# Patient Record
Sex: FEMALE | Race: WHITE | NOT HISPANIC OR LATINO | Employment: OTHER | URBAN - METROPOLITAN AREA
[De-identification: names, ages, dates, MRNs, and addresses within clinical notes are randomized per-mention and may not be internally consistent; named-entity substitution may affect disease eponyms.]

---

## 2017-01-09 ENCOUNTER — TELEPHONE (OUTPATIENT)
Dept: NEUROLOGY | Facility: MEDICAL CENTER | Age: 67
End: 2017-01-09

## 2017-01-24 ENCOUNTER — OFFICE VISIT (OUTPATIENT)
Dept: NEUROLOGY | Facility: MEDICAL CENTER | Age: 67
End: 2017-01-24
Payer: MEDICARE

## 2017-01-24 VITALS
SYSTOLIC BLOOD PRESSURE: 118 MMHG | HEART RATE: 80 BPM | OXYGEN SATURATION: 97 % | HEIGHT: 64 IN | BODY MASS INDEX: 25.27 KG/M2 | DIASTOLIC BLOOD PRESSURE: 70 MMHG | TEMPERATURE: 97.9 F | WEIGHT: 148 LBS

## 2017-01-24 DIAGNOSIS — G40.909 SEIZURE DISORDER (HCC): Chronic | ICD-10-CM

## 2017-01-24 PROCEDURE — 1101F PT FALLS ASSESS-DOCD LE1/YR: CPT | Mod: 8P | Performed by: PSYCHIATRY & NEUROLOGY

## 2017-01-24 PROCEDURE — G8420 CALC BMI NORM PARAMETERS: HCPCS | Performed by: PSYCHIATRY & NEUROLOGY

## 2017-01-24 PROCEDURE — 3014F SCREEN MAMMO DOC REV: CPT | Performed by: PSYCHIATRY & NEUROLOGY

## 2017-01-24 PROCEDURE — 4040F PNEUMOC VAC/ADMIN/RCVD: CPT | Performed by: PSYCHIATRY & NEUROLOGY

## 2017-01-24 PROCEDURE — 3017F COLORECTAL CA SCREEN DOC REV: CPT | Performed by: PSYCHIATRY & NEUROLOGY

## 2017-01-24 PROCEDURE — 1036F TOBACCO NON-USER: CPT | Performed by: PSYCHIATRY & NEUROLOGY

## 2017-01-24 PROCEDURE — 99214 OFFICE O/P EST MOD 30 MIN: CPT | Performed by: PSYCHIATRY & NEUROLOGY

## 2017-01-24 PROCEDURE — G8482 FLU IMMUNIZE ORDER/ADMIN: HCPCS | Performed by: PSYCHIATRY & NEUROLOGY

## 2017-01-24 PROCEDURE — G8432 DEP SCR NOT DOC, RNG: HCPCS | Performed by: PSYCHIATRY & NEUROLOGY

## 2017-01-24 RX ORDER — LAMOTRIGINE 25 MG/1
25 TABLET ORAL DAILY
Qty: 30 TAB | Refills: 11 | Status: SHIPPED | OUTPATIENT
Start: 2017-01-24 | End: 2018-02-13 | Stop reason: SDUPTHER

## 2017-01-24 NOTE — MR AVS SNAPSHOT
"Saida Roblero   2017 10:40 AM   Office Visit   MRN: [id number]    Department:  [facility]   Dept Phone:  [phone]    Description:  Female : 1950   Provider:  Melissa P Bloch, M.D.           Reason for Visit     Seizure follow up      Allergies as of 2017     Allergen Noted Reactions    Peanut-Derived 2014       Shellfish Allergy 2014         You were diagnosed with     Seizure disorder (CMS-HCC)   [465948]         Vital Signs     Blood Pressure Pulse Temperature Height Weight Body Mass Index    118/70 mmHg 80 36.6 °C (97.9 °F) 1.626 m (5' 4\") 67.132 kg (148 lb) 25.39 kg/m2    Oxygen Saturation Smoking Status                97% Never Smoker           Basic Information     Date Of Birth Sex Race Ethnicity Preferred Language    1950 Female White Non- English      Your appointments     May 01, 2017 10:00 AM   Established Patient with Anjelica Vidal D.O.   [facility]    [address] [address] [phone]           You will be receiving a confirmation call a few days before your appointment from our automated call confirmation system.              Problem List              ICD-10-CM Priority Class Noted - Resolved    Seizure disorder (CMS-HCC) (Chronic) G40.909 High  2013 - Present    Hypothyroid (Chronic) E03.9   2013 - Present    Peanut allergy Z91.010   1/3/2014 - Present    Shellfish allergy Z91.013   1/3/2014 - Present    Palpitations R00.2   3/24/2014 - Present    Symptomatic PVCs I49.3   3/24/2014 - Present    HLD (hyperlipidemia) E78.5   2015 - Present      Health Maintenance        Date Due Completion Dates    COLONOSCOPY 2017    MAMMOGRAM 10/26/2017 10/26/2016, 2015, 2014, 2013, 2012, 2011, 2010, 2010, 3/4/2009, 3/4/2009, 2008, 2008, 2007, 2005, 2004    BONE DENSITY 2018, 2008    PAP SMEAR " 12/6/2019 12/6/2016, 12/3/2013 (Prv Comp)    Override on 12/3/2013: Previously completed (normal - Dr. Muse's APN)    IMM DTaP/Tdap/Td Vaccine (2 - Td) 1/3/2024 1/3/2014            Current Immunizations     13-VALENT PCV PREVNAR 10/31/2016    Influenza TIV (IM) 1/13/2013    Influenza Vaccine Adult HD 10/31/2016    Pneumococcal polysaccharide vaccine (PPSV-23) 1/3/2014    SHINGLES VACCINE 1/3/2014    Tdap Vaccine 1/3/2014    Tuberculin Skin Test 9/25/2013      Below and/or attached are the medications your provider expects you to take. Review all of your home medications and newly ordered medications with your provider and/or pharmacist. Follow medication instructions as directed by your provider and/or pharmacist. Please keep your medication list with you and share with your provider. Update the information when medications are discontinued, doses are changed, or new medications (including over-the-counter products) are added; and carry medication information at all times in the event of emergency situations     Allergies:  PEANUT-DERIVED - (reactions not documented)     SHELLFISH ALLERGY - (reactions not documented)               Medications  Valid as of: January 24, 2017 - 11:24 AM    Generic Name Brand Name Tablet Size Instructions for use    Aspirin (Chew Tab) ASA 81 MG Take 81 mg by mouth every day.        Cholecalciferol (Tab) cholecalciferol 1000 UNIT Take 1,000 Units by mouth every day.        EPINEPHrine   by Injection route.        LamoTRIgine (Tab) LAMICTAL 25 MG Take 1 Tab by mouth every day.        Nutritional Supplements   Take  by mouth.        Pregnenolone (Powder) Pregnenolone  by Does not apply route.        Thyroid (Tab) ARMOUR THYROID 90 MG Take 1 Tab by mouth every day.        .                 Medicines prescribed today were sent to:     [id number] [place] - [address]    [address] [place] [address]    Phone: [phone] Fax: [phone]    Open 24 Hours?: No      Medication  refill instructions:       If your prescription bottle indicates you have medication refills left, it is not necessary to call your provider’s office. Please contact your pharmacy and they will refill your medication.    If your prescription bottle indicates you do not have any refills left, you may request refills at any time through one of the following ways: The online [employer] system (except Urgent Care), by calling your provider’s office, or by asking your pharmacy to contact your provider’s office with a refill request. Medication refills are processed only during regular business hours and may not be available until the next business day. Your provider may request additional information or to have a follow-up visit with you prior to refilling your medication.   *Please Note: Medication refills are assigned a new Rx number when refilled electronically. Your pharmacy may indicate that no refills were authorized even though a new prescription for the same medication is available at the pharmacy. Please request the medicine by name with the pharmacy before contacting your provider for a refill.           [employer] Access Code: Activation code not generated  Current [employer] Status: Active

## 2017-01-26 NOTE — PROGRESS NOTES
CHIEF COMPLAINT  Chief Complaint   Patient presents with   • Seizure     follow up       HPI  Saida Roblero is a 64 y.o. female who presents for follow up of seizure.  Seizure disorder (CMS-HCC)  PT HAS BEEN STABLE ON 25 MG Of LAMICTAL.      REVIEW OF SYSTEMS  Pertinent Positives:irregular HR, healthy diet, hypothyroid symptoms during drug adjustment   All other systems are negative.     PAST MEDICAL HISTORY  Past Medical History   Diagnosis Date   • Seizure (CMS-HCC)    • Thyroid disease    • Allergy      peanut/shellfish       SOCIAL HISTORY  Social History     Social History   • Marital Status:      Spouse Name: N/A   • Number of Children: N/A   • Years of Education: N/A     Occupational History   • Not on file.     Social History Main Topics   • Smoking status: Never Smoker    • Smokeless tobacco: Never Used   • Alcohol Use: No   • Drug Use: No   • Sexual Activity:     Partners: Male     Other Topics Concern   • Not on file     Social History Narrative       SURGICAL HISTORY  Past Surgical History   Procedure Laterality Date   • Arthroscopy, knee  1998     right   • Carpal tunnel release  1998     right   • Myomectomy         CURRENT MEDICATIONS  Current Outpatient Prescriptions   Medication Sig Dispense Refill   • lamotrigine (LAMICTAL) 25 MG Tab Take 1 Tab by mouth every day. 30 Tab 11   • aspirin (ASA) 81 MG Chew Tab chewable tablet Take 81 mg by mouth every day.     • vitamin D (CHOLECALCIFEROL) 1000 UNIT Tab Take 1,000 Units by mouth every day.     • Nutritional Supplements (DHEA PO) Take  by mouth.     • Pregnenolone Powder by Does not apply route.     • thyroid (ARMOUR THYROID) 90 MG Tab Take 1 Tab by mouth every day. (Patient taking differently: Take 120 mg by mouth every day.) 30 Tab 1   • EPINEPHrine (EPIPEN INJ) by Injection route.       No current facility-administered medications for this visit.       ALLERGIES  Allergies   Allergen Reactions   • Peanut-Derived    • Shellfish  "Allergy        PHYSICAL EXAM  VITAL SIGNS: /70 mmHg  Pulse 80  Temp(Src) 36.6 °C (97.9 °F)  Ht 1.626 m (5' 4\")  Wt 67.132 kg (148 lb)  BMI 25.39 kg/m2  SpO2 97%  Constitutional: Well developed, Well nourished, No acute distress, Non-toxic appearance.   HENT: normocephalic; atraumatic  Eyes: PERRL  Neck: Normal range of motion, No tenderness, Supple, No stridor.   Cardiovascular: Normal heart rate, Normal rhythm, No murmurs, No rubs, No gallops.   Thorax & Lungs: Normal breath sounds, No respiratory distress, No wheezing, No chest tenderness.   Abdomen: Bowel sounds normal, Soft, No tenderness, No masses, No pulsatile masses.   Skin: Warm, Dry, No erythema, No rash.   Back: No tenderness, No CVA tenderness.   Extremities: Intact distal pulses, No edema, No tenderness, No cyanosis, No clubbing.   Neurologic: a&Ox3, CN:2-12, motor : intact, sensory: Intact, CB and gait:intact  Psychiatric: Affect normal, Judgment normal, Mood normal.   Lab: Reviewed with patient in detail and as noted in results.    EEG  Abnormal   IMPRESSION: Possibly abnormal electroencephalogram with rare left   temporal sharp wave activity with phase reversals. This could   represent an underlying paroxysmal focus or could represent a normal   variant. Clinical correlation is suggested.            ASSESSMENT AND PLAN  1. Seizure disorder   Continue lamictal at low dose as seizures well contolled     I spent 25 minutes with this patient, over fifty percent was spent counseling patient on their condition, best management practices, reviewing test results and risks and benefits of treatment.        "

## 2017-02-07 ENCOUNTER — OFFICE VISIT (OUTPATIENT)
Dept: MEDICAL GROUP | Facility: PHYSICIAN GROUP | Age: 67
End: 2017-02-07
Payer: MEDICARE

## 2017-02-07 VITALS
DIASTOLIC BLOOD PRESSURE: 76 MMHG | TEMPERATURE: 98.6 F | SYSTOLIC BLOOD PRESSURE: 158 MMHG | RESPIRATION RATE: 16 BRPM | HEIGHT: 64 IN | HEART RATE: 106 BPM | WEIGHT: 146 LBS | BODY MASS INDEX: 24.92 KG/M2 | OXYGEN SATURATION: 98 %

## 2017-02-07 DIAGNOSIS — N61.1 LEFT BREAST ABSCESS: ICD-10-CM

## 2017-02-07 PROBLEM — N63.23 BREAST LUMP ON LEFT SIDE AT 5 O'CLOCK POSITION: Status: ACTIVE | Noted: 2017-02-07

## 2017-02-07 PROCEDURE — G8420 CALC BMI NORM PARAMETERS: HCPCS | Performed by: NURSE PRACTITIONER

## 2017-02-07 PROCEDURE — 1101F PT FALLS ASSESS-DOCD LE1/YR: CPT | Performed by: NURSE PRACTITIONER

## 2017-02-07 PROCEDURE — 99214 OFFICE O/P EST MOD 30 MIN: CPT | Performed by: NURSE PRACTITIONER

## 2017-02-07 PROCEDURE — G8432 DEP SCR NOT DOC, RNG: HCPCS | Performed by: NURSE PRACTITIONER

## 2017-02-07 PROCEDURE — 3014F SCREEN MAMMO DOC REV: CPT | Performed by: NURSE PRACTITIONER

## 2017-02-07 PROCEDURE — 4040F PNEUMOC VAC/ADMIN/RCVD: CPT | Performed by: NURSE PRACTITIONER

## 2017-02-07 PROCEDURE — 3017F COLORECTAL CA SCREEN DOC REV: CPT | Performed by: NURSE PRACTITIONER

## 2017-02-07 PROCEDURE — G8482 FLU IMMUNIZE ORDER/ADMIN: HCPCS | Performed by: NURSE PRACTITIONER

## 2017-02-07 PROCEDURE — 1036F TOBACCO NON-USER: CPT | Performed by: NURSE PRACTITIONER

## 2017-02-07 RX ORDER — THYROID, PORCINE 120 MG/1
120 TABLET ORAL DAILY
COMMUNITY
Start: 2017-02-02

## 2017-02-07 RX ORDER — THYROID 30 MG/1
30 TABLET ORAL
COMMUNITY
Start: 2017-02-02 | End: 2017-12-01

## 2017-02-07 RX ORDER — DOXYCYCLINE HYCLATE 100 MG
100 TABLET ORAL 2 TIMES DAILY
Qty: 20 TAB | Refills: 0 | Status: SHIPPED | OUTPATIENT
Start: 2017-02-07 | End: 2017-05-01

## 2017-02-07 ASSESSMENT — PAIN SCALES - GENERAL: PAINLEVEL: NO PAIN

## 2017-02-07 NOTE — MR AVS SNAPSHOT
"Saida Roblero   2017 10:40 AM   Office Visit   MRN: [id number]    Department:  [facility]   Dept Phone:  [phone]    Description:  Female : 1950   Provider:  UNRULY Kothari           Reason for Visit     Nodule L breast x 1 year       Allergies as of 2017     Allergen Noted Reactions    Peanut-Derived 2014       Shellfish Allergy 2014         You were diagnosed with     Left breast abscess   [[id number]]         Vital Signs     Blood Pressure Pulse Temperature Respirations Height Weight    158/76 mmHg 106 37 °C (98.6 °F) 16 1.626 m (5' 4\") 66.225 kg (146 lb)    Body Mass Index Oxygen Saturation Breastfeeding? Smoking Status          25.05 kg/m2 98% No Never Smoker         Basic Information     Date Of Birth Sex Race Ethnicity Preferred Language    1950 Female White Non- English      Your appointments     May 01, 2017 10:00 AM   [facility]    [address] [address]   [phone]           You will be receiving a confirmation call a few days before your appointment from our automated call confirmation system.              Problem List              ICD-10-CM Priority Class Noted - Resolved    Seizure disorder (CMS-HCC) (Chronic) G40.909 High  2013 - Present    Hypothyroid (Chronic) E03.9   2013 - Present    Peanut allergy Z91.010   1/3/2014 - Present    Shellfish allergy Z91.013   1/3/2014 - Present    Palpitations R00.2   3/24/2014 - Present    Symptomatic PVCs I49.3   3/24/2014 - Present    HLD (hyperlipidemia) E78.5   2015 - Present    Left breast abscess N61.1   2017 - Present      Health Maintenance        Date Due Completion Dates    COLONOSCOPY 2017    MAMMOGRAM 10/26/2017 10/26/2016, 2015, 2014, 2013, 2012, 2011, 2010, 2010, 3/4/2009, 3/4/2009, 2008, 2008, 2007, " 12/16/2005, 11/12/2004    BONE DENSITY 7/25/2018 7/25/2013, 8/1/2008    PAP SMEAR 12/6/2019 12/6/2016, 12/3/2013 (Prv Comp)    Override on 12/3/2013: Previously completed (normal - Dr. Muse's APN)    IMM DTaP/Tdap/Td Vaccine (2 - Td) 1/3/2024 1/3/2014            Current Immunizations     13-VALENT PCV PREVNAR 10/31/2016    Influenza TIV (IM) 1/13/2013    Influenza Vaccine Adult HD 10/31/2016    Pneumococcal polysaccharide vaccine (PPSV-23) 1/3/2014    SHINGLES VACCINE 1/3/2014    Tdap Vaccine 1/3/2014    Tuberculin Skin Test 9/25/2013      Below and/or attached are the medications your provider expects you to take. Review all of your home medications and newly ordered medications with your provider and/or pharmacist. Follow medication instructions as directed by your provider and/or pharmacist. Please keep your medication list with you and share with your provider. Update the information when medications are discontinued, doses are changed, or new medications (including over-the-counter products) are added; and carry medication information at all times in the event of emergency situations     Allergies:  PEANUT-DERIVED - (reactions not documented)     SHELLFISH ALLERGY - (reactions not documented)               Medications  Valid as of: February 07, 2017 - 11:43 AM    Generic Name Brand Name Tablet Size Instructions for use    Aspirin (Chew Tab) ASA 81 MG Take 81 mg by mouth every day.        Cholecalciferol (Tab) cholecalciferol 1000 UNIT Take 1,000 Units by mouth every day.        Doxycycline Hyclate (Tab) VIBRAMYCIN 100 MG Take 1 Tab by mouth 2 times a day.        EPINEPHrine   by Injection route.        LamoTRIgine (Tab) LAMICTAL 25 MG Take 1 Tab by mouth every day.        Nutritional Supplements   Take  by mouth.        Pregnenolone (Powder) Pregnenolone  by Does not apply route.        Thyroid (Tab) ARMOUR THYROID 120 MG Take 120 mg by mouth every day.        Thyroid (Tab) ARMOUR THYROID 30 MG Take 30 mg  by mouth every 3 days. 2x weekly        .                 Medicines prescribed today were sent to:     [id number] - [place] [place] - [address]    [address] [place] [address]    Phone: [phone] Fax: [phone]    Open 24 Hours?: No      Medication refill instructions:       If your prescription bottle indicates you have medication refills left, it is not necessary to call your provider’s office. Please contact your pharmacy and they will refill your medication.    If your prescription bottle indicates you do not have any refills left, you may request refills at any time through one of the following ways: The online [employer] system (except Urgent Care), by calling your provider’s office, or by asking your pharmacy to contact your provider’s office with a refill request. Medication refills are processed only during regular business hours and may not be available until the next business day. Your provider may request additional information or to have a follow-up visit with you prior to refilling your medication.   *Please Note: Medication refills are assigned a new Rx number when refilled electronically. Your pharmacy may indicate that no refills were authorized even though a new prescription for the same medication is available at the pharmacy. Please request the medicine by name with the pharmacy before contacting your provider for a refill.        Your To Do List     Future Labs/Procedures Complete By Expires    US-BREAST COMPLETE-LEFT  As directed 2/7/2018      Referral     A referral request has been sent to our patient care coordination department. Please allow 3-5 business days for us to process this request and contact you either by phone or mail. If you do not hear from us by the 5th business day, please call us at [phone].           [employer] Access Code: Activation code not generated  Current [employer] Status: Active

## 2017-02-07 NOTE — PROGRESS NOTES
"Chief Complaint   Patient presents with   • Nodule     L breast x 1 year        HISTORY OF PRESENT ILLNESS: Patient is a 66 y.o. female established patient who presents today to discuss left breast lump.    Left breast abscess  Patient had lump for approximately 2 years, was the size of a nickel, mobile, painless, told it was a cyst or possible fatty deposit. States that yesterday she woke up and noticed it was tender. States that it was larger and has continued to increase in size over the last 24 hours. Patient states that it looks like a \"boil\", with a white head and is tender to the touch. Patient states that she has been using warm compresses, which improves pain. Patient has not tried any over-the-counter pain medications. Patient states nothing makes it better but that it is getting worse over time.      Patient Active Problem List    Diagnosis Date Noted   • Seizure disorder (CMS-[facility]) 05/13/2013     Priority: High   • Left breast abscess 02/07/2017   • HLD (hyperlipidemia) 06/24/2015   • Palpitations 03/24/2014   • Symptomatic PVCs 03/24/2014   • Peanut allergy 01/03/2014   • Shellfish allergy 01/03/2014   • Hypothyroid 05/13/2013       Allergies:Peanut-derived and Shellfish allergy    Current Outpatient Prescriptions   Medication Sig Dispense Refill   • doxycycline (VIBRAMYCIN) 100 MG Tab Take 1 Tab by mouth 2 times a day. 20 Tab 0   • lamotrigine (LAMICTAL) 25 MG Tab Take 1 Tab by mouth every day. 30 Tab 11   • aspirin (ASA) 81 MG Chew Tab chewable tablet Take 81 mg by mouth every day.     • vitamin D (CHOLECALCIFEROL) 1000 UNIT Tab Take 1,000 Units by mouth every day.     • Nutritional Supplements (DHEA PO) Take  by mouth.     • Pregnenolone Powder by Does not apply route.     • ARMOUR THYROID 120 MG Tab Take 120 mg by mouth every day.     • ARMOUR THYROID 30 MG Tab Take 30 mg by mouth every 3 days. 2x weekly     • EPINEPHrine (EPIPEN INJ) by Injection route.       No current facility-administered " "medications for this visit.       Social History   Substance Use Topics   • Smoking status: Never Smoker    • Smokeless tobacco: Never Used   • Alcohol Use: No       Family Status   Relation Status Death Age   • Mother     • Father       Family History   Problem Relation Age of Onset   • Heart Disease Maternal Grandmother    • Thyroid Mother    • Stroke Mother    • Heart Disease Mother      a fib   • Other Mother      temp arth   • Other Father      waldenstroms lymphoma       Review of Systems:   Constitutional:  Negative for fever, chills, weight loss and malaise/fatigue.   HEENT:  Negative for ear pain, nosebleeds, congestion, sore throat and neck pain.    Eyes:  Negative for blurred vision.   Respiratory:  Negative for cough, sputum production, shortness of breath and wheezing.    Cardiovascular:  Negative for chest pain, palpitations, orthopnea and leg swelling.   Gastrointestinal:  Negative for heartburn, nausea, vomiting and abdominal pain.   Genitourinary:  Negative for dysuria, urgency and frequency.   Musculoskeletal:  Negative for myalgias, back pain and joint pain.   Skin:  Negative for rash and itching. Positive lesion, left breast.  Neurological:  Negative for dizziness, tingling, tremors, sensory change, focal weakness and headaches.   Endo/Heme/Allergies:  Does not bruise/bleed easily.   Psychiatric/Behavioral:  Negative for depression, suicidal ideas and memory loss.  The patient is not nervous/anxious and does not have insomnia.    All other systems reviewed and are negative except as in HPI.    Exam:  Blood pressure 158/76, pulse 106, temperature 37 °C (98.6 °F), resp. rate 16, height 1.626 m (5' 4\"), weight 66.225 kg (146 lb), SpO2 98 %, not currently breastfeeding.  General:  Normal appearing. No distress.  HEENT:  Normocephalic. Eyes conjunctiva clear lids without ptosis, pupils equal and reactive to light accommodation, ears normal shape and contour, canals are clear " bilaterally, tympanic membranes are benign, nasal mucosa benign, oropharynx is without erythema, edema or exudates.   Neck:  Supple without JVD or bruit. Thyroid is not enlarged.  Pulmonary:  Clear to ausculation.  Normal effort. No rales, ronchi, or wheezing.  Cardiovascular:  Regular rate and rhythm without murmur. Carotid and radial pulses are intact and equal bilaterally.  Abdomen:  Soft, nontender, nondistended. Normal bowel sounds. Liver and spleen are not palpable  Neurologic:  Grossly nonfocal  Lymph:  No cervical, supraclavicular or axillary lymph nodes are palpable  Skin:  Warm and dry.  4 in oval erythematous, swollen area, warm to touch on left inferior lateral breast (5 'o clock) with 1 in white center. Tender to palpation.   Musculoskeletal:  Normal gait. No extremity cyanosis, clubbing, or edema.  Psych:  Normal mood and affect. Alert and oriented x3. Judgment and insight is normal.      Medical decision-making and discussion: Saida is generally well-appearing 66 -year-old female patient here today to discuss left breast abscess. Ultrasound of abscess is ordered to determine need for surgical intervention. Doxycycline 100 mg twice a day ×10 days ordered at this time for MRSA coverage. Patient is encouraged to use warm compresses 15 min 4 times per day. Counseled patient that abscess may open and drain on its own and patient should encourage drainage, keep the area clean and dry. If wound opened patient is encouraged to follow up for wound care. Referral to Dr. Mcneil provided for possible I&D. Patient is encouraged to follow up in the emergency room if she develops fever, chills or systemic symptoms of infection. Patient is also encouragedin the emergency room for dizziness, low blood pressure, chest pain, palpitations, shortness of breath. Patient will follow-up in 2 weeks as needed with PCP.      Please note that this dictation was created using voice recognition software. I have made every  reasonable attempt to correct obvious errors, but I expect that there are errors of grammar and possibly content that I did not discover before finalizing the note.    Assessment/Plan:  1. Left breast abscess  doxycycline (VIBRAMYCIN) 100 MG Tab    US-BREAST COMPLETE-LEFT    REFERRAL TO GENERAL SURGERY          I have placed the below orders and discussed them with an approved delegating provider. The MA is performing the below orders under the direction of Dr. Montague.

## 2017-02-07 NOTE — ASSESSMENT & PLAN NOTE
"Patient had lump for approximately 2 years, was the size of a nickel, mobile, painless, told it was a cyst or possible fatty deposit. States that yesterday she woke up and noticed it was tender. States that it was larger and has continued to increase in size over the last 24 hours. Patient states that it looks like a \"boil\", with a white head and is tender to the touch. Patient states that she has been using warm compresses, which improves pain. Patient has not tried any over-the-counter pain medications. Patient states nothing makes it better but that it is getting worse over time.  "

## 2017-02-09 ENCOUNTER — HOSPITAL ENCOUNTER (OUTPATIENT)
Dept: RADIOLOGY | Facility: MEDICAL CENTER | Age: 67
End: 2017-02-09
Attending: NURSE PRACTITIONER
Payer: MEDICARE

## 2017-02-09 ENCOUNTER — HOSPITAL ENCOUNTER (OUTPATIENT)
Dept: RADIOLOGY | Facility: MEDICAL CENTER | Age: 67
End: 2017-02-09
Attending: FAMILY MEDICINE
Payer: MEDICARE

## 2017-02-09 DIAGNOSIS — N61.1 ABSCESS OF LEFT BREAST: ICD-10-CM

## 2017-02-09 PROCEDURE — 76642 ULTRASOUND BREAST LIMITED: CPT | Mod: LT

## 2017-02-10 ENCOUNTER — TELEPHONE (OUTPATIENT)
Dept: MEDICAL GROUP | Facility: PHYSICIAN GROUP | Age: 67
End: 2017-02-10

## 2017-02-10 ENCOUNTER — HOSPITAL ENCOUNTER (EMERGENCY)
Facility: MEDICAL CENTER | Age: 67
End: 2017-02-10
Attending: EMERGENCY MEDICINE
Payer: MEDICARE

## 2017-02-10 VITALS
WEIGHT: 145.5 LBS | DIASTOLIC BLOOD PRESSURE: 79 MMHG | RESPIRATION RATE: 18 BRPM | OXYGEN SATURATION: 98 % | HEIGHT: 64 IN | TEMPERATURE: 98.4 F | BODY MASS INDEX: 24.84 KG/M2 | SYSTOLIC BLOOD PRESSURE: 149 MMHG | HEART RATE: 100 BPM

## 2017-02-10 DIAGNOSIS — N61.1 BREAST ABSCESS OF FEMALE: ICD-10-CM

## 2017-02-10 DIAGNOSIS — L02.818 CUTANEOUS ABSCESS OF OTHER SITE: ICD-10-CM

## 2017-02-10 PROCEDURE — 99283 EMERGENCY DEPT VISIT LOW MDM: CPT

## 2017-02-10 PROCEDURE — 303977 HCHG I & D

## 2017-02-10 RX ORDER — HYDROCODONE BITARTRATE AND ACETAMINOPHEN 5; 325 MG/1; MG/1
1-2 TABLET ORAL EVERY 4 HOURS PRN
Qty: 20 TAB | Refills: 0 | Status: SHIPPED | OUTPATIENT
Start: 2017-02-10 | End: 2017-12-01

## 2017-02-10 RX ORDER — CLINDAMYCIN HYDROCHLORIDE 300 MG/1
300 CAPSULE ORAL 4 TIMES DAILY
Qty: 40 CAP | Refills: 0 | Status: SHIPPED | OUTPATIENT
Start: 2017-02-10 | End: 2017-05-01

## 2017-02-10 ASSESSMENT — PAIN SCALES - GENERAL: PAINLEVEL_OUTOF10: 4

## 2017-02-10 NOTE — ED AVS SNAPSHOT
Home Care Instructions                                                                                                                Saida Roblero   MRN: [id number]    Department:  [facility], Emergency Dept   Date of Visit:  2/10/2017            [facility], Emergency Dept    [address]    [place] [address]    Phone:  [phone]      You were seen by     Bill Jacobo M.D.      Your Diagnosis Was     Breast abscess of female     N61.1       Medication Information     Review all of your home medications and newly ordered medications with your primary doctor and/or pharmacist as soon as possible. Follow medication instructions as directed by your doctor and/or pharmacist.     Please keep your complete medication list with you and share with your physician. Update the information when medications are discontinued, doses are changed, or new medications (including over-the-counter products) are added; and carry medication information at all times in the event of emergency situations.               Medication List      START taking these medications        Instructions    clindamycin 300 MG Caps   Commonly known as:  CLEOCIN    Take 1 Cap by mouth 4 times a day.   Dose:  300 mg       hydrocodone-acetaminophen 5-325 MG Tabs per tablet   Commonly known as:  NORCO    Take 1-2 Tabs by mouth every four hours as needed.   Dose:  1-2 Tab         ASK your doctor about these medications        Instructions    * ARMOUR THYROID 120 MG Tabs   Generic drug:  thyroid    Take 120 mg by mouth every day.   Dose:  120 mg       * ARMOUR THYROID 30 MG Tabs   Generic drug:  thyroid    Take 30 mg by mouth every 3 days. 2x weekly   Dose:  30 mg       aspirin 81 MG Chew chewable tablet   Commonly known as:  ASA    Take 81 mg by mouth every day.   Dose:  81 mg       DHEA PO    Take  by mouth.       doxycycline 100 MG Tabs   Commonly known as:  VIBRAMYCIN    Take 1 Tab by  mouth 2 times a day.   Dose:  100 mg       EPIPEN INJ    by Injection route.       lamotrigine 25 MG Tabs   Commonly known as:  LAMICTAL    Take 1 Tab by mouth every day.   Dose:  25 mg       Pregnenolone Powd    by Does not apply route.       vitamin D 1000 UNIT Tabs   Commonly known as:  cholecalciferol    Take 1,000 Units by mouth every day.   Dose:  1000 Units       * Notice:  This list has 2 medication(s) that are the same as other medications prescribed for you. Read the directions carefully, and ask your doctor or other care provider to review them with you.              Discharge Instructions       Percutaneous Abscess Drain, Care After  Refer to this sheet in the next few weeks. These instructions provide you with information on caring for yourself after your procedure. Your health care provider may also give you more specific instructions. Your treatment has been planned according to current medical practices, but problems sometimes occur. Call your health care provider if you have any problems or questions after your procedure.  WHAT TO EXPECT AFTER THE PROCEDURE  After your procedure, it is typical to have the following:   · A small amount of discomfort in the area where the drainage tube was placed.  · A small amount of bruising around the area where the drainage tube was placed.  · Sleepiness and fatigue for the rest of the day from the medicines used.  HOME CARE INSTRUCTIONS  · Rest at home for 1-2 days following your procedure or as directed by your health care provider.  · If you go home right after the procedure, plan to have someone with you for 24 hours.  · Do not take a bath or shower for 24 hours after your procedure.  · Take medicines only as directed by your health care provider. Ask your health care provider when you can resume taking any normal medicines.  · Change bandages (dressings) as directed.    · You may be told to record the amount of drainage from the bag every time you empty it.  Follow your health care provider's directions for emptying the bag. Write down the amount of drainage, the date, and the time you emptied it.  · Call your health care provider when the drain is putting out less than 10 mL of drainage per day for 2-3 days in a row or as directed by your health care provider.  · Follow your health care provider's instructions for cleaning the drainage tube. You may need to clean the tube every day so that it does not clog.  SEEK MEDICAL CARE IF:  · You have increased bleeding (more than a small spot) from the site where the drainage tube was placed.  · You have redness, swelling, or increasing pain around the site where the drainage tube was placed.  · You notice a discharge or bad smell coming from the site where the drainage tube was placed.  · You have a fever or chills.   · You have pain that is not helped by medicine.    SEEK IMMEDIATE MEDICAL CARE IF:  · There is leakage around the drainage tube.  · The drainage tube pulls out.  · You suddenly stop having drainage from the tube.  · You suddenly have blood in the drainage fluid.  · You become dizzy or faint.  · You develop a rash.    · You have nausea or vomiting.  · You have difficulty breathing, feel short of breath, or feel faint.    · You develop chest pain.  · You have problems with your speech or vision.  · You have trouble balancing or moving your arms or legs.     This information is not intended to replace advice given to you by your health care provider. Make sure you discuss any questions you have with your health care provider.     Document Released: 05/04/2015 Document Revised: 10/06/2015 Document Reviewed: 05/04/2015  [employer] Interactive Patient Education ©2016 [employer]    Abscess  An abscess is an infected area that contains a collection of pus and debris. It can occur in almost any part of the body. An abscess is also known as a furuncle or boil.  CAUSES   An abscess occurs when tissue gets infected. This  can occur from blockage of oil or sweat glands, infection of hair follicles, or a minor injury to the skin. As the body tries to fight the infection, pus collects in the area and creates pressure under the skin. This pressure causes pain. People with weakened immune systems have difficulty fighting infections and get certain abscesses more often.   SYMPTOMS  Usually an abscess develops on the skin and becomes a painful mass that is red, warm, and tender. If the abscess forms under the skin, you may feel a moveable soft area under the skin. Some abscesses break open (rupture) on their own, but most will continue to get worse without care. The infection can spread deeper into the body and eventually into the bloodstream, causing you to feel ill.   DIAGNOSIS   Your caregiver will take your medical history and perform a physical exam. A sample of fluid may also be taken from the abscess to determine what is causing your infection.  TREATMENT   Your caregiver may prescribe antibiotic medicines to fight the infection. However, taking antibiotics alone usually does not cure an abscess. Your caregiver may need to make a small cut (incision) in the abscess to drain the pus. In some cases, gauze is packed into the abscess to reduce pain and to continue draining the area.  HOME CARE INSTRUCTIONS   · Only take over-the-counter or prescription medicines for pain, discomfort, or fever as directed by your caregiver.  · If you were prescribed antibiotics, take them as directed. Finish them even if you start to feel better.  · If gauze is used, follow your caregiver's directions for changing the gauze.  · To avoid spreading the infection:  · Keep your draining abscess covered with a bandage.  · Wash your hands well.  · Do not share personal care items, towels, or whirlpools with others.  · Avoid skin contact with others.  · Keep your skin and clothes clean around the abscess.  · Keep all follow-up appointments as directed by your  caregiver.  SEEK MEDICAL CARE IF:   · You have increased pain, swelling, redness, fluid drainage, or bleeding.  · You have muscle aches, chills, or a general ill feeling.  · You have a fever.  MAKE SURE YOU:   · Understand these instructions.  · Will watch your condition.  · Will get help right away if you are not doing well or get worse.     This information is not intended to replace advice given to you by your health care provider. Make sure you discuss any questions you have with your health care provider.     Document Released: 09/27/2006 Document Revised: 06/18/2013 Document Reviewed: 03/01/2013  [employer] Interactive Patient Education ©2016 [employer]    Incision and Drainage  Incision and drainage is a procedure in which a sac-like structure (cystic structure) is opened and drained. The area to be drained usually contains material such as pus, fluid, or blood.   LET YOUR CAREGIVER KNOW ABOUT:   · Allergies to medicine.  · Medicines taken, including vitamins, herbs, eyedrops, over-the-counter medicines, and creams.  · Use of steroids (by mouth or creams).  · Previous problems with anesthetics or numbing medicines.  · History of bleeding problems or blood clots.  · Previous surgery.  · Other health problems, including diabetes and kidney problems.  · Possibility of pregnancy, if this applies.  RISKS AND COMPLICATIONS  · Pain.  · Bleeding.  · Scarring.  · Infection.  BEFORE THE PROCEDURE   You may need to have an ultrasound or other imaging tests to see how large or deep your cystic structure is. Blood tests may also be used to determine if you have an infection or how severe the infection is. You may need to have a tetanus shot.  PROCEDURE   The affected area is cleaned with a cleaning fluid. The cyst area will then be numbed with a medicine (local anesthetic). A small incision will be made in the cystic structure. A syringe or catheter may be used to drain the contents of the cystic structure, or the  contents may be squeezed out. The area will then be flushed with a cleansing solution. After cleansing the area, it is often gently packed with a gauze or another wound dressing. Once it is packed, it will be covered with gauze and tape or some other type of wound dressing.   AFTER THE PROCEDURE   · Often, you will be allowed to go home right after the procedure.  · You may be given antibiotic medicine to prevent or heal an infection.  · If the area was packed with gauze or some other wound dressing, you will likely need to come back in 1 to 2 days to get it removed.  · The area should heal in about 14 days.     This information is not intended to replace advice given to you by your health care provider. Make sure you discuss any questions you have with your health care provider.    Packing removal in 2-3 days. Return if increased pain or fever.     Document Released: 06/13/2002 Document Revised: 06/18/2013 Document Reviewed: 02/11/2013  [employer] Interactive Patient Education ©2016 [employer]            Patient Information     Patient Information    Following emergency treatment: all patient requiring follow-up care must return either to a private physician or a clinic if your condition worsens before you are able to obtain further medical attention, please return to the emergency room.     Billing Information    At [facility], we work to make the billing process streamlined for our patients.  Our Representatives are here to answer any questions you may have regarding your hospital bill.  If you have insurance coverage and have supplied your insurance information to us, we will submit a claim to your insurer on your behalf.  Should you have any questions regarding your bill, we can be reached online or by phone as follows:  Online: You are able pay your bills online or live chat with our representatives about any billing questions you may have. We are here to help Monday - Friday from 8:00am to 7:30pm and  9:00am - 12:00pm on Saturdays.  Please visit [email or web address]  for more information.   Phone:  [phone] or [phone]    Please note that your emergency physician, surgeon, pathologist, radiologist, anesthesiologist, and other specialists are not employed by [facility] and will therefore bill separately for their services.  Please contact them directly for any questions concerning their bills at the numbers below:     Emergency Physician Services:  [phone]  [facility]:  [phone]  [facility]:  [phone]  [facility]:  [phone]    1. Your final bill may vary from the amount quoted upon discharge if all procedures are not complete at that time, or if your doctor has additional procedures of which we are not aware. You will receive an additional bill if you return to the Emergency Department at [facility] for suture removal regardless of the facility of which the sutures were placed.     2. Please arrange for settlement of this account at the emergency registration.    3. All self-pay accounts are due in full at the time of treatment.  If you are unable to meet this obligation then payment is expected within 4-5 days.     4. If you have had radiology studies (CT, X-ray, Ultrasound, MRI), you have received a preliminary result during your emergency department visit. Please contact the radiology department [phone] to receive a copy of your final result. Please discuss the Final result with your primary physician or with the follow up physician provided.     Crisis Hotline:  [place] Crisis Hotline:  4-431-YVHEVDX or 1-802.476.2288  [place] Crisis Hotline:    1-518.877.1588 or 454-303-7419         ED Discharge Follow Up Questions    1. In order to provide you with very good care, we would like to follow up with a phone call in the next few days.  May we have your permission to contact you?     YES /   NO    2. What is the best phone number to call you? (       )_____-__________    3. What is the best time to call you?      Morning  /  Afternoon  /  Evening                   Patient Signature:  ____________________________________________________________    Date:  ____________________________________________________________      Your appointments     May 01, 2017 10:00 AM   [facility]    [address] [facility] [id number] [phone]           You will be receiving a confirmation call a few days before your appointment from our automated call confirmation system.

## 2017-02-10 NOTE — ED PROVIDER NOTES
"ED Provider Note    CHIEF COMPLAINT  Chief Complaint   Patient presents with   • Abscess       HPI  Saida Roblero is a 66 y.o. female who presents to the emergency department for evaluation of worsening pain to a left breast abscess. Patient was seen by her physician on Tuesday. Patient has increased pain and redness. Patient noted a small lump to this area 2 years ago and is suddenly increased in size. She describes no fever chills or constitutional symptoms. No aggravating or relieving maneuvers.    Review of chart shows the patient had an ultrasound of the left breast yesterday. No previous ER evaluations. No recent hospitalizations.    REVIEW OF SYSTEMS  See HPI for further details. All other systems are negative.     PAST MEDICAL HISTORY  Past Medical History   Diagnosis Date   • Seizure (CMS-HCC)    • Thyroid disease    • Allergy      peanut/shellfish       FAMILY HISTORY  No significant family history    SOCIAL HISTORY  Social History     Social History   • Marital Status:      Spouse Name: N/A   • Number of Children: N/A   • Years of Education: N/A     Social History Main Topics   • Smoking status: Never Smoker    • Smokeless tobacco: Never Used   • Alcohol Use: No   • Drug Use: No   • Sexual Activity:     Partners: Male     Other Topics Concern   • None     Social History Narrative       SURGICAL HISTORY  Past Surgical History   Procedure Laterality Date   • Arthroscopy, knee  1998     right   • Carpal tunnel release  1998     right   • Myomectomy         CURRENT MEDICATIONS  Home Medications     **Home medications have not yet been reviewed for this encounter**          ALLERGIES  Allergies   Allergen Reactions   • Peanut-Derived    • Shellfish Allergy        PHYSICAL EXAM  VITAL SIGNS: /79 mmHg  Pulse 100  Temp(Src) 36.9 °C (98.4 °F)  Resp 18  Ht 1.626 m (5' 4\")  Wt 66 kg (145 lb 8.1 oz)  BMI 24.96 kg/m2  SpO2 98%  Constitutional: Well developed, Well nourished, No acute " distress, Non-toxic appearance.   HENT: Normocephalic, Atraumatic, Bilateral external ears normal, Oropharynx moist, no evidence of dehydration, No oral exudates, Nose normal.   Eyes: PERRLA, EOMI, Conjunctiva normal, No discharge.   Neck: Normal range of motion, No tenderness, Supple, No stridor. No masses. No evidence of meningitis or meningismus.   Lymphatic: No lymphadenopathy noted.   Thorax & Lungs: Normal breath sounds, No respiratory distress, No wheezing or rhonchi, No chest tenderness.   Skin: Warm, Dry, No erythema, No rash. No exanthem. Large lesion to the lateral left breast. Moderate erythema. Fluctuant consistent with abscess.  Extremities:  No edema, No tenderness, No cyanosis, No clubbing.   Musculoskeletal: Good range of motion in all major joints. No major deformities noted.   Neurologic: Alert & oriented x 3, Normal motor function, No focal deficits noted.   Psychiatric: Affect normal, mood normal.                        RADIOLOGY/PROCEDURES  IMPRESSION:     1.  Complex compressible fluid collection is identified in the left breast at the 4:00 position 8 cm from the nipple. Differential diagnosis includes breast abscess. Hematoma or inflamed fluid collection is also a possibility. This lesion is probably    benign. Follow-up left breast ultrasound is recommended in 3 months.     I and D of breast abscess: Discussed with patient pain medication prior or even conscious sedation. Patient has a problem and that she cannot get a ride home. For this reason we have opted local anesthesia. The area was prepped with Betadine. Area over the apex of lesion injected with lidocaine with epinephrine. The area was incised with expulsion of a large amount of purulent material. Was expressed. Loculations broken up with needle . The abscess was then packed with gauze. Sterile dressing applied. Patient tolerated the procedure without difficulty.      COURSE & MEDICAL DECISION MAKING  Pertinent Labs & Imaging  studies reviewed. (See chart for details)  Patient tolerated ID without difficulty.    Patient discharged with Vicodin for pain clindamycin. She has follow up appointment on Monday with his surgeon. Patient's packing removal in 2-3 days. Return here if increased pain or swelling.    FINAL IMPRESSION  1. Breast abscess of female    2. Cutaneous abscess of other site    3. Incision and drainage of breast abscess by myself      Electronically signed by: Bill Jacobo, 2/10/2017 1:46 PM

## 2017-02-10 NOTE — TELEPHONE ENCOUNTER
----- Message from UNRULY Kothari sent at 2/10/2017 11:53 AM PST -----  Please call pt and give lab results: ultrasound is consistent with abscess. If this is not resolving rapidly with antibiotics and warm compresses I would recommend moving forward with surgical consultation. If you have systemic symptoms including fever or chills please follow-up in urgent care or emergency.

## 2017-02-10 NOTE — ED AVS SNAPSHOT
Childcare Bridge Access Code: Activation code not generated  Current Childcare Bridge Status: Active    Househappyhart  A secure, online tool to manage your health information     [employer]’s Childcare Bridge® is a secure, online tool that connects you to your personalized health information from the privacy of your home -- day or night - making it very easy for you to manage your healthcare. Once the activation process is completed, you can even access your medical information using the Childcare Bridge gray, which is available for free in the Apple Gray store or Google Play store.     Childcare Bridge provides the following levels of access (as shown below):   My Chart Features   [facility] Primary Care Doctor [facility]  Specialists [facility]  Urgent  Care Non-[facility]  Primary Care  Doctor   Email your healthcare team securely and privately 24/7 X X X X   Manage appointments: schedule your next appointment; view details of past/upcoming appointments X      Request prescription refills. X      View recent personal medical records, including lab and immunizations X X X X   View health record, including health history, allergies, medications X X X X   Read reports about your outpatient visits, procedures, consult and ER notes X X X X   See your discharge summary, which is a recap of your hospital and/or ER visit that includes your diagnosis, lab results, and care plan. X X       How to register for Childcare Bridge:  1. Go to  [email or web address].  2. Click on the Sign Up Now box, which takes you to the New Member Sign Up page. You will need to provide the following information:  a. Enter your Childcare Bridge Access Code exactly as it appears at the top of this page. (You will not need to use this code after you’ve completed the sign-up process. If you do not sign up before the expiration date, you must request a new code.)   b. Enter your date of birth.   c. Enter your home email address.   d. Click Submit, and follow the next screen’s instructions.  3. Create a Childcare Bridge ID. This will  be your ShareThe login ID and cannot be changed, so think of one that is secure and easy to remember.  4. Create a ShareThe password. You can change your password at any time.  5. Enter your Password Reset Question and Answer. This can be used at a later time if you forget your password.   6. Enter your e-mail address. This allows you to receive e-mail notifications when new information is available in ShareThe.  7. Click Sign Up. You can now view your health information.    For assistance activating your ShareThe account, call [phone]

## 2017-02-10 NOTE — ED AVS SNAPSHOT
2/10/2017          Saida Roblero  [address]  [place] [address]    Dear Saida:    [facility] wants to ensure your discharge home is safe and you or your loved ones have had all your questions answered regarding your care after you leave the hospital.    You may receive a telephone call within two days of your discharge.  This call is to make certain you understand your discharge instructions as well as ensure we provided you with the best care possible during your stay with us.     The call will only last approximately 3-5 minutes and will be done by a nurse.    Once again, we want to ensure your discharge home is safe and that you have a clear understanding of any next steps in your care.  If you have any questions or concerns, please do not hesitate to contact us, we are here for you.  Thank you for choosing [facility] for your healthcare needs.    Sincerely,    Elmer Gonzales    [facility]

## 2017-02-10 NOTE — TELEPHONE ENCOUNTER
Phone Number Called: [phone] (home)     Message: LVM to call back.    Left Message for patient to call back: yes

## 2017-02-10 NOTE — ED NOTES
Seen at her PCP's office for left breast abscess this past Tuesday, pt presents to our facility reporting worsening pain and appearance.

## 2017-02-10 NOTE — DISCHARGE INSTRUCTIONS
Percutaneous Abscess Drain, Care After  Refer to this sheet in the next few weeks. These instructions provide you with information on caring for yourself after your procedure. Your health care provider may also give you more specific instructions. Your treatment has been planned according to current medical practices, but problems sometimes occur. Call your health care provider if you have any problems or questions after your procedure.  WHAT TO EXPECT AFTER THE PROCEDURE  After your procedure, it is typical to have the following:   · A small amount of discomfort in the area where the drainage tube was placed.  · A small amount of bruising around the area where the drainage tube was placed.  · Sleepiness and fatigue for the rest of the day from the medicines used.  HOME CARE INSTRUCTIONS  · Rest at home for 1-2 days following your procedure or as directed by your health care provider.  · If you go home right after the procedure, plan to have someone with you for 24 hours.  · Do not take a bath or shower for 24 hours after your procedure.  · Take medicines only as directed by your health care provider. Ask your health care provider when you can resume taking any normal medicines.  · Change bandages (dressings) as directed.    · You may be told to record the amount of drainage from the bag every time you empty it. Follow your health care provider's directions for emptying the bag. Write down the amount of drainage, the date, and the time you emptied it.  · Call your health care provider when the drain is putting out less than 10 mL of drainage per day for 2-3 days in a row or as directed by your health care provider.  · Follow your health care provider's instructions for cleaning the drainage tube. You may need to clean the tube every day so that it does not clog.  SEEK MEDICAL CARE IF:  · You have increased bleeding (more than a small spot) from the site where the drainage tube was placed.  · You have redness,  swelling, or increasing pain around the site where the drainage tube was placed.  · You notice a discharge or bad smell coming from the site where the drainage tube was placed.  · You have a fever or chills.   · You have pain that is not helped by medicine.    SEEK IMMEDIATE MEDICAL CARE IF:  · There is leakage around the drainage tube.  · The drainage tube pulls out.  · You suddenly stop having drainage from the tube.  · You suddenly have blood in the drainage fluid.  · You become dizzy or faint.  · You develop a rash.    · You have nausea or vomiting.  · You have difficulty breathing, feel short of breath, or feel faint.    · You develop chest pain.  · You have problems with your speech or vision.  · You have trouble balancing or moving your arms or legs.     This information is not intended to replace advice given to you by your health care provider. Make sure you discuss any questions you have with your health care provider.     Document Released: 05/04/2015 Document Revised: 10/06/2015 Document Reviewed: 05/04/2015  IfOnly Interactive Patient Education ©2016 [employer]    Abscess  An abscess is an infected area that contains a collection of pus and debris. It can occur in almost any part of the body. An abscess is also known as a furuncle or boil.  CAUSES   An abscess occurs when tissue gets infected. This can occur from blockage of oil or sweat glands, infection of hair follicles, or a minor injury to the skin. As the body tries to fight the infection, pus collects in the area and creates pressure under the skin. This pressure causes pain. People with weakened immune systems have difficulty fighting infections and get certain abscesses more often.   SYMPTOMS  Usually an abscess develops on the skin and becomes a painful mass that is red, warm, and tender. If the abscess forms under the skin, you may feel a moveable soft area under the skin. Some abscesses break open (rupture) on their own, but most will  continue to get worse without care. The infection can spread deeper into the body and eventually into the bloodstream, causing you to feel ill.   DIAGNOSIS   Your caregiver will take your medical history and perform a physical exam. A sample of fluid may also be taken from the abscess to determine what is causing your infection.  TREATMENT   Your caregiver may prescribe antibiotic medicines to fight the infection. However, taking antibiotics alone usually does not cure an abscess. Your caregiver may need to make a small cut (incision) in the abscess to drain the pus. In some cases, gauze is packed into the abscess to reduce pain and to continue draining the area.  HOME CARE INSTRUCTIONS   · Only take over-the-counter or prescription medicines for pain, discomfort, or fever as directed by your caregiver.  · If you were prescribed antibiotics, take them as directed. Finish them even if you start to feel better.  · If gauze is used, follow your caregiver's directions for changing the gauze.  · To avoid spreading the infection:  · Keep your draining abscess covered with a bandage.  · Wash your hands well.  · Do not share personal care items, towels, or whirlpools with others.  · Avoid skin contact with others.  · Keep your skin and clothes clean around the abscess.  · Keep all follow-up appointments as directed by your caregiver.  SEEK MEDICAL CARE IF:   · You have increased pain, swelling, redness, fluid drainage, or bleeding.  · You have muscle aches, chills, or a general ill feeling.  · You have a fever.  MAKE SURE YOU:   · Understand these instructions.  · Will watch your condition.  · Will get help right away if you are not doing well or get worse.     This information is not intended to replace advice given to you by your health care provider. Make sure you discuss any questions you have with your health care provider.     Document Released: 09/27/2006 Document Revised: 06/18/2013 Document Reviewed:  03/01/2013  [employer] Interactive Patient Education ©2016 [employer]    Incision and Drainage  Incision and drainage is a procedure in which a sac-like structure (cystic structure) is opened and drained. The area to be drained usually contains material such as pus, fluid, or blood.   LET YOUR CAREGIVER KNOW ABOUT:   · Allergies to medicine.  · Medicines taken, including vitamins, herbs, eyedrops, over-the-counter medicines, and creams.  · Use of steroids (by mouth or creams).  · Previous problems with anesthetics or numbing medicines.  · History of bleeding problems or blood clots.  · Previous surgery.  · Other health problems, including diabetes and kidney problems.  · Possibility of pregnancy, if this applies.  RISKS AND COMPLICATIONS  · Pain.  · Bleeding.  · Scarring.  · Infection.  BEFORE THE PROCEDURE   You may need to have an ultrasound or other imaging tests to see how large or deep your cystic structure is. Blood tests may also be used to determine if you have an infection or how severe the infection is. You may need to have a tetanus shot.  PROCEDURE   The affected area is cleaned with a cleaning fluid. The cyst area will then be numbed with a medicine (local anesthetic). A small incision will be made in the cystic structure. A syringe or catheter may be used to drain the contents of the cystic structure, or the contents may be squeezed out. The area will then be flushed with a cleansing solution. After cleansing the area, it is often gently packed with a gauze or another wound dressing. Once it is packed, it will be covered with gauze and tape or some other type of wound dressing.   AFTER THE PROCEDURE   · Often, you will be allowed to go home right after the procedure.  · You may be given antibiotic medicine to prevent or heal an infection.  · If the area was packed with gauze or some other wound dressing, you will likely need to come back in 1 to 2 days to get it removed.  · The area should heal in  about 14 days.     This information is not intended to replace advice given to you by your health care provider. Make sure you discuss any questions you have with your health care provider.    Packing removal in 2-3 days. Return if increased pain or fever.     Document Released: 06/13/2002 Document Revised: 06/18/2013 Document Reviewed: 02/11/2013  [employer] Interactive Patient Education ©2016 [employer]vier [employer].

## 2017-02-10 NOTE — ED NOTES
Discharge information provided. Pt verbalized understanding of discharge instructions to follow up with PCP and to return to ER if condition worsens. Pt expressed the awareness of not driving or operating heavy machinery. Pt ambulated out of ER in a steady gait.

## 2017-02-11 NOTE — TELEPHONE ENCOUNTER
I spoke with patient and she explained she went to the ER earlier today and they took care of the abscess.  She has a follow up with Surgical on Tuesday.

## 2017-05-01 ENCOUNTER — OFFICE VISIT (OUTPATIENT)
Dept: MEDICAL GROUP | Facility: PHYSICIAN GROUP | Age: 67
End: 2017-05-01
Payer: MEDICARE

## 2017-05-01 VITALS
HEIGHT: 64 IN | WEIGHT: 147 LBS | OXYGEN SATURATION: 97 % | HEART RATE: 87 BPM | TEMPERATURE: 97 F | DIASTOLIC BLOOD PRESSURE: 70 MMHG | RESPIRATION RATE: 16 BRPM | BODY MASS INDEX: 25.1 KG/M2 | SYSTOLIC BLOOD PRESSURE: 146 MMHG

## 2017-05-01 DIAGNOSIS — G40.909 SEIZURE DISORDER (HCC): Chronic | ICD-10-CM

## 2017-05-01 DIAGNOSIS — E03.8 OTHER SPECIFIED HYPOTHYROIDISM: Chronic | ICD-10-CM

## 2017-05-01 DIAGNOSIS — E78.2 MIXED HYPERLIPIDEMIA: ICD-10-CM

## 2017-05-01 PROCEDURE — 3017F COLORECTAL CA SCREEN DOC REV: CPT | Performed by: FAMILY MEDICINE

## 2017-05-01 PROCEDURE — G8432 DEP SCR NOT DOC, RNG: HCPCS | Performed by: FAMILY MEDICINE

## 2017-05-01 PROCEDURE — 99214 OFFICE O/P EST MOD 30 MIN: CPT | Performed by: FAMILY MEDICINE

## 2017-05-01 PROCEDURE — 1101F PT FALLS ASSESS-DOCD LE1/YR: CPT | Performed by: FAMILY MEDICINE

## 2017-05-01 PROCEDURE — 4040F PNEUMOC VAC/ADMIN/RCVD: CPT | Performed by: FAMILY MEDICINE

## 2017-05-01 PROCEDURE — 1036F TOBACCO NON-USER: CPT | Performed by: FAMILY MEDICINE

## 2017-05-01 PROCEDURE — G8419 CALC BMI OUT NRM PARAM NOF/U: HCPCS | Performed by: FAMILY MEDICINE

## 2017-05-01 PROCEDURE — 3014F SCREEN MAMMO DOC REV: CPT | Performed by: FAMILY MEDICINE

## 2017-05-01 NOTE — ASSESSMENT & PLAN NOTE
Ongoing issue. Patient reports 100% compliance with 2 doses of [place] Thyroid: 120 mg daily with an additional 30 mg every 3 days. She currently is followed by an endocrine specialists. Lab review shows that her TSH continues to be suppressed with a normal T4. She reports that overall she feels well and denies any specific issues.

## 2017-05-01 NOTE — PROGRESS NOTES
Subjective:   Saida Roblero is a 66 y.o. female here today for thyroid; lipids; seizure    Seizure disorder (CMS-HCC)  Ongoing issue. Patient reports 100% compliance with Lamictal 25 mg daily. Patient denies occurrence of any seizures. She currently is being followed by neurology.    Hypothyroid  Ongoing issue. Patient reports 100% compliance with 2 doses of [place] Thyroid: 120 mg daily with an additional 30 mg every 3 days. She currently is followed by an endocrine specialists. Lab review shows that her TSH continues to be suppressed with a normal T4. She reports that overall she feels well and denies any specific issues.    HLD (hyperlipidemia)  Ongoing issue. Patient currently is on a medication but chart does show that she has an elevated total cholesterol and triglyceride level. Patient denies any significant family history at this point.         Current medicines (including changes today)  Current Outpatient Prescriptions   Medication Sig Dispense Refill   • hydrocodone-acetaminophen (NORCO) 5-325 MG Tab per tablet Take 1-2 Tabs by mouth every four hours as needed. 20 Tab 0   • ARMOUR THYROID 120 MG Tab Take 120 mg by mouth every day.     • ARMOUR THYROID 30 MG Tab Take 30 mg by mouth every 3 days. 2x weekly     • lamotrigine (LAMICTAL) 25 MG Tab Take 1 Tab by mouth every day. 30 Tab 11   • aspirin (ASA) 81 MG Chew Tab chewable tablet Take 81 mg by mouth every day.     • vitamin D (CHOLECALCIFEROL) 1000 UNIT Tab Take 1,000 Units by mouth every day.     • Nutritional Supplements (DHEA PO) Take  by mouth.     • Pregnenolone Powder by Does not apply route.     • EPINEPHrine (EPIPEN INJ) by Injection route.       No current facility-administered medications for this visit.     She  has a past medical history of Seizure (CMS-HCC); Thyroid disease; and Allergy. She also has no past medical history of Breast cancer (CMS-HCC).    ROS   No chest pain, no shortness of breath, no abdominal pain       Objective:  "    Blood pressure 146/70, pulse 87, temperature 36.1 °C (97 °F), resp. rate 16, height 1.626 m (5' 4.02\"), weight 66.679 kg (147 lb), SpO2 97 %. Body mass index is 25.22 kg/(m^2).   Physical Exam:  Alert, oriented in no acute distress.  Eye contact is good, speech goal directed, affect calm  HEENT: conjunctiva non-injected, sclera non-icteric.  Pinna normal. TM pearly gray.   Oral mucous membranes pink and moist with no lesions.  Neck No adenopathy or masses in the neck or supraclavicular regions.  Lungs: clear to auscultation bilaterally with good excursion.  CV: regular rate and rhythm.  Abdomen: soft, nontender, No CVAT  Ext: no edema, color normal, vascularity normal, temperature normal  Neuro: CN 2-12 grossly intact      Assessment and Plan:   The following treatment plan was discussed     1. Other specified hypothyroidism  TSH    FREE THYROXINE    TRIIDOTHYRONINE    THYROID PEROXIDASE  (TPO) AB    PROLACTIN    Stable. Recommend a repeat labs in 6 months; continue current medications; monitor   2. Mixed hyperlipidemia  LIPID PROFILE    Uncontrolled. Encouraged healthy eating and daily exercise; recheck labs in 6 months. Monitor   3. Seizure disorder (CMS-HCC)      Stable. Continue current medications; continue follow-up with neurology. Monitor       Followup: Return in about 4 weeks (around 5/29/2017) for thyroid/lipids/labs, Short.            "

## 2017-05-01 NOTE — MR AVS SNAPSHOT
"        Saida GUERRERO AdelitatammiFelisha   2017 10:00 AM   Office Visit   MRN: [id number]    Department:  [facility]   Dept Phone:  [phone]    Description:  Female : 1950   Provider:  Anjelica Vidal D.O.           Reason for Visit     Follow-Up check up      Allergies as of 2017     Allergen Noted Reactions    Peanut-Derived 2014       Shellfish Allergy 2014         You were diagnosed with     Other specified hypothyroidism   [3952944]       Mixed hyperlipidemia   [272.2.ICD-9-CM]       Seizure disorder (CMS-HCC)   [820644]         Vital Signs     Blood Pressure Pulse Temperature Respirations Height Weight    146/70 mmHg 87 36.1 °C (97 °F) 16 1.626 m (5' 4.02\") 66.679 kg (147 lb)    Body Mass Index Oxygen Saturation Smoking Status             25.22 kg/m2 97% Never Smoker          Basic Information     Date Of Birth Sex Race Ethnicity Preferred Language    1950 Female White Non- English      Your appointments     2017  7:40 AM   Established Patient with Anjelica Vidal D.O.   [facility] ([place])    [address] [place] [id number] [phone]           You will be receiving a confirmation call a few days before your appointment from our automated call confirmation system.              Problem List              ICD-10-CM Priority Class Noted - Resolved    Seizure disorder (CMS-HCC) (Chronic) G40.909 High  2013 - Present    Hypothyroid (Chronic) E03.9   2013 - Present    Peanut allergy Z91.010   1/3/2014 - Present    Shellfish allergy Z91.013   1/3/2014 - Present    Palpitations R00.2   3/24/2014 - Present    Symptomatic PVCs I49.3   3/24/2014 - Present    HLD (hyperlipidemia) E78.5   2015 - Present    Left breast abscess N61.1   2017 - Present      Health Maintenance        Date Due Completion Dates    COLONOSCOPY 2017    MAMMOGRAM 10/26/2017 10/26/2016, 2015, 2014, 2013, 2012, 2011, " 4/2/2010, 4/2/2010, 3/4/2009, 3/4/2009, 2/11/2008, 2/11/2008, 2/2/2007, 12/16/2005, 11/12/2004    BONE DENSITY 7/25/2018 7/25/2013, 8/1/2008    PAP SMEAR 12/6/2019 12/6/2016, 12/3/2013 (Prv Comp)    Override on 12/3/2013: Previously completed (normal - Dr. Muse's APN)    IMM DTaP/Tdap/Td Vaccine (2 - Td) 1/3/2024 1/3/2014            Current Immunizations     13-VALENT PCV PREVNAR 10/31/2016    Influenza TIV (IM) 1/13/2013    Influenza Vaccine Adult HD 10/31/2016    Pneumococcal polysaccharide vaccine (PPSV-23) 1/3/2014    SHINGLES VACCINE 1/3/2014    Tdap Vaccine 1/3/2014    Tuberculin Skin Test 9/25/2013      Below and/or attached are the medications your provider expects you to take. Review all of your home medications and newly ordered medications with your provider and/or pharmacist. Follow medication instructions as directed by your provider and/or pharmacist. Please keep your medication list with you and share with your provider. Update the information when medications are discontinued, doses are changed, or new medications (including over-the-counter products) are added; and carry medication information at all times in the event of emergency situations     Allergies:  PEANUT-DERIVED - (reactions not documented)     SHELLFISH ALLERGY - (reactions not documented)               Medications  Valid as of: May 01, 2017 - 10:43 AM    Generic Name Brand Name Tablet Size Instructions for use    Aspirin (Chew Tab) ASA 81 MG Take 81 mg by mouth every day.        Cholecalciferol (Tab) cholecalciferol 1000 UNIT Take 1,000 Units by mouth every day.        EPINEPHrine   by Injection route.        Hydrocodone-Acetaminophen (Tab) NORCO 5-325 MG Take 1-2 Tabs by mouth every four hours as needed.        LamoTRIgine (Tab) LAMICTAL 25 MG Take 1 Tab by mouth every day.        Nutritional Supplements   Take  by mouth.        Pregnenolone (Powder) Pregnenolone  by Does not apply route.        Thyroid (Tab) ARMOUR THYROID 120  MG Take 120 mg by mouth every day.        Thyroid (Tab) JC THYROID 30 MG Take 30 mg by mouth every 3 days. 2x weekly        .                 Medicines prescribed today were sent to:     [id number] [place], [place] - [address]    [address] [place] [address]    Phone: [phone] Fax: [phone]    Open 24 Hours?: No      Medication refill instructions:       If your prescription bottle indicates you have medication refills left, it is not necessary to call your provider’s office. Please contact your pharmacy and they will refill your medication.    If your prescription bottle indicates you do not have any refills left, you may request refills at any time through one of the following ways: The online Badoo system (except Urgent Care), by calling your provider’s office, or by asking your pharmacy to contact your provider’s office with a refill request. Medication refills are processed only during regular business hours and may not be available until the next business day. Your provider may request additional information or to have a follow-up visit with you prior to refilling your medication.   *Please Note: Medication refills are assigned a new Rx number when refilled electronically. Your pharmacy may indicate that no refills were authorized even though a new prescription for the same medication is available at the pharmacy. Please request the medicine by name with the pharmacy before contacting your provider for a refill.        Your To Do List     Future Labs/Procedures Complete By Expires    FREE THYROXINE  As directed 5/2/2018    LIPID PROFILE  As directed 5/2/2018    PROLACTIN  As directed 5/1/2018    THYROID PEROXIDASE  (TPO) AB  As directed 5/2/2018    TRIIDOTHYRONINE  As directed 5/1/2018    TSH  As directed 5/2/2018         Badoo Access Code: Activation code not generated  Current Badoo Status: Active

## 2017-05-01 NOTE — ASSESSMENT & PLAN NOTE
Ongoing issue. Patient currently is on a medication but chart does show that she has an elevated total cholesterol and triglyceride level. Patient denies any significant family history at this point.

## 2017-05-01 NOTE — ASSESSMENT & PLAN NOTE
Ongoing issue. Patient reports 100% compliance with Lamictal 25 mg daily. Patient denies occurrence of any seizures. She currently is being followed by neurology.

## 2017-05-03 ENCOUNTER — PATIENT OUTREACH (OUTPATIENT)
Dept: HEALTH INFORMATION MANAGEMENT | Facility: OTHER | Age: 67
End: 2017-05-03

## 2017-05-03 NOTE — PROGRESS NOTES
Attempt #:1    WebIZ Checked & Epic Updated: yes  HealthConnect Verified: yes  Verify PCP: yes    Communication Preference Obtained: yes     Review Care Team: yes    Annual Wellness Visit Scheduling  1. Scheduling Status:Scheduled     Care Gap Scheduling (Attempt to Schedule EACH Overdue Care Gap!)     Health Maintenance Due   Topic Date Due   • Annual Wellness Visit  Scheduled   • COLONOSCOPY  Will discuss with PCP         EveryScapehart Activation: already active  66. [employer] Gray: no  Virtual Visits: no  Opt In to Text Messages: no

## 2017-05-11 ENCOUNTER — HOSPITAL ENCOUNTER (OUTPATIENT)
Dept: LAB | Facility: MEDICAL CENTER | Age: 67
End: 2017-05-11
Attending: FAMILY MEDICINE
Payer: MEDICARE

## 2017-05-11 DIAGNOSIS — E78.2 MIXED HYPERLIPIDEMIA: ICD-10-CM

## 2017-05-11 DIAGNOSIS — E03.8 OTHER SPECIFIED HYPOTHYROIDISM: Chronic | ICD-10-CM

## 2017-05-11 LAB
CHOLEST SERPL-MCNC: 226 MG/DL (ref 100–199)
HDLC SERPL-MCNC: 74 MG/DL
LDLC SERPL CALC-MCNC: 128 MG/DL
PROLACTIN SERPL-MCNC: 6.65 NG/ML (ref 2.8–26)
T3 SERPL-MCNC: 146.6 NG/DL (ref 60–181)
T4 FREE SERPL-MCNC: 1.12 NG/DL (ref 0.53–1.43)
THYROPEROXIDASE AB SERPL-ACNC: 0.5 IU/ML (ref 0–9)
TRIGL SERPL-MCNC: 118 MG/DL (ref 0–149)
TSH SERPL DL<=0.005 MIU/L-ACNC: 0.03 UIU/ML (ref 0.3–3.7)

## 2017-05-11 PROCEDURE — 86376 MICROSOMAL ANTIBODY EACH: CPT

## 2017-05-11 PROCEDURE — 36415 COLL VENOUS BLD VENIPUNCTURE: CPT

## 2017-05-11 PROCEDURE — 84480 ASSAY TRIIODOTHYRONINE (T3): CPT

## 2017-05-11 PROCEDURE — 80061 LIPID PANEL: CPT

## 2017-05-11 PROCEDURE — 84439 ASSAY OF FREE THYROXINE: CPT

## 2017-05-11 PROCEDURE — 84443 ASSAY THYROID STIM HORMONE: CPT

## 2017-05-11 PROCEDURE — 84146 ASSAY OF PROLACTIN: CPT

## 2017-05-16 ENCOUNTER — TELEPHONE (OUTPATIENT)
Dept: MEDICAL GROUP | Facility: PHYSICIAN GROUP | Age: 67
End: 2017-05-16

## 2017-05-16 NOTE — TELEPHONE ENCOUNTER
.ANNUAL WELLNESS VISIT PRE-VISIT PLANNING     1.  Reviewed note from last office visit with PCP: YES    2.  If any orders were placed at last visit, do we have Results/Consult Notes?        •  Labs - Labs ordered, completed and results are in chart.       •  Imaging - Imaging was not ordered at last office visit.       •  Referrals - No referrals were ordered at last office visit.    3.  Immunizations were updated in Epic using WebIZ?: Epic matches WebIZ       •  WebIZ Recommendations: Patient is up to date on all vaccines       •  Is patient due for Tdap? NO       •  Is patient due for Shingles? NO     4.  Patient is due for the following Health Maintenance Topics:   Health Maintenance Due   Topic Date Due   • Annual Wellness Visit  06/24/2016   • COLONOSCOPY  01/17/2017       Due for Colonoscopy, attempted to call and schedule    5.  Reviewed/Updated the following with patient:       •   Preferred Pharmacy? NO       •   Preferred Lab? NO       •   Medications? NO       •   Social History? NO       •   Family History? NO    6.  Care Team Updated:       •   DME Company (gait device, O2, CPAP, etc.): N\A       •   Other Specialists (eye doctor, derm, GYN, cardiology, endo, etc): N\A    7.  Patient has the following Care Path diagnoses on Problem List:      8.  Specialty Comments was updated with diagnosis information provided by [facility]: NO    9.  Patient was advised: “This is a free wellness visit. The provider will screen for medical conditions to help you stay healthy. If you have other concerns to address you may be asked to discuss these at a separate visit or there may be an additional fee.”     10.  Patient was informed to arrive 15 min prior to their scheduled appointment and bring in their medication bottles?  YES

## 2017-06-13 ENCOUNTER — OFFICE VISIT (OUTPATIENT)
Dept: MEDICAL GROUP | Facility: PHYSICIAN GROUP | Age: 67
End: 2017-06-13
Payer: MEDICARE

## 2017-06-13 VITALS
TEMPERATURE: 99 F | BODY MASS INDEX: 24.75 KG/M2 | WEIGHT: 145 LBS | OXYGEN SATURATION: 95 % | DIASTOLIC BLOOD PRESSURE: 74 MMHG | SYSTOLIC BLOOD PRESSURE: 136 MMHG | HEIGHT: 64 IN | HEART RATE: 94 BPM

## 2017-06-13 DIAGNOSIS — E78.2 MIXED HYPERLIPIDEMIA: ICD-10-CM

## 2017-06-13 DIAGNOSIS — Z91.010 PEANUT ALLERGY: ICD-10-CM

## 2017-06-13 DIAGNOSIS — G40.909 SEIZURE DISORDER (HCC): Chronic | ICD-10-CM

## 2017-06-13 DIAGNOSIS — I49.3 PVC'S (PREMATURE VENTRICULAR CONTRACTIONS): ICD-10-CM

## 2017-06-13 DIAGNOSIS — E03.9 ACQUIRED HYPOTHYROIDISM: Chronic | ICD-10-CM

## 2017-06-13 DIAGNOSIS — Z00.00 MEDICARE ANNUAL WELLNESS VISIT, SUBSEQUENT: ICD-10-CM

## 2017-06-13 PROBLEM — N61.1 LEFT BREAST ABSCESS: Status: RESOLVED | Noted: 2017-02-07 | Resolved: 2017-06-13

## 2017-06-13 PROCEDURE — G0439 PPPS, SUBSEQ VISIT: HCPCS | Performed by: NURSE PRACTITIONER

## 2017-06-13 PROCEDURE — 99999 PR ANNUAL WELLNESS VISIT-INCLUDES PPPS SUBSEQUE*: CPT | Performed by: NURSE PRACTITIONER

## 2017-06-13 RX ORDER — EPINEPHRINE 0.3 MG/.3ML
0.3 INJECTION SUBCUTANEOUS ONCE
Qty: 0.3 ML | Refills: 0 | Status: SHIPPED | OUTPATIENT
Start: 2017-06-13 | End: 2017-06-13

## 2017-06-13 ASSESSMENT — PATIENT HEALTH QUESTIONNAIRE - PHQ9: CLINICAL INTERPRETATION OF PHQ2 SCORE: 0

## 2017-06-13 NOTE — MR AVS SNAPSHOT
"Saida Roblero   2017 8:00 AM   Office Visit   MRN: [id number]    Department:  [facility]   Dept Phone:  [phone]    Description:  Female : 1950   Provider:  UNRULY Honeycutt; [facility]            Reason for Visit     Annual Exam           Allergies as of 2017     Allergen Noted Reactions    Peanut-Derived 2014       Shellfish Allergy 2014         You were diagnosed with     Medicare annual wellness visit, subsequent   [732259]       Symptomatic PVCs   [693443]       Mixed hyperlipidemia   [272.2.ICD-9-CM]         Vital Signs     Blood Pressure Pulse Temperature Height Weight Body Mass Index    136/74 mmHg 94 37.2 °C (99 °F) 1.626 m (5' 4\") 65.772 kg (145 lb) 24.88 kg/m2    Oxygen Saturation Smoking Status                95% Never Smoker           Basic Information     Date Of Birth Sex Race Ethnicity Preferred Language    1950 Female White Non- English      Problem List              ICD-10-CM Priority Class Noted - Resolved    Seizure disorder (CMS-HCC) (Chronic) G40.909 High  2013 - Present    Hypothyroid (Chronic) E03.9   2013 - Present    Peanut allergy Z91.010   1/3/2014 - Present    Shellfish allergy Z91.013   1/3/2014 - Present    Symptomatic PVCs I49.3   3/24/2014 - Present    HLD (hyperlipidemia) E78.5   2015 - Present      Health Maintenance        Date Due Completion Dates    COLONOSCOPY 2017    MAMMOGRAM 10/26/2017 10/26/2016, 2015, 2014, 2013, 2012, 2011, 2010, 2010, 3/4/2009, 3/4/2009, 2008, 2008, 2007, 2005, 2004    BONE DENSITY 2018, 2008    PAP SMEAR 2019, 12/3/2013 (Prv Comp)    Override on 12/3/2013: Previously completed (normal - Dr. Muse's APN)    IMM DTaP/Tdap/Td Vaccine (2 - Td) 1/3/2024 1/3/2014            Current Immunizations     13-VALENT PCV PREVNAR 10/31/2016    Influenza TIV (IM) " 1/13/2013    Influenza Vaccine Adult HD 10/31/2016    Pneumococcal polysaccharide vaccine (PPSV-23) 1/3/2014    SHINGLES VACCINE 1/3/2014    Tdap Vaccine 1/3/2014    Tuberculin Skin Test 9/25/2013      Below and/or attached are the medications your provider expects you to take. Review all of your home medications and newly ordered medications with your provider and/or pharmacist. Follow medication instructions as directed by your provider and/or pharmacist. Please keep your medication list with you and share with your provider. Update the information when medications are discontinued, doses are changed, or new medications (including over-the-counter products) are added; and carry medication information at all times in the event of emergency situations     Allergies:  PEANUT-DERIVED - (reactions not documented)     SHELLFISH ALLERGY - (reactions not documented)               Medications  Valid as of: June 13, 2017 -  8:56 AM    Generic Name Brand Name Tablet Size Instructions for use    Aspirin (Chew Tab) ASA 81 MG Take 81 mg by mouth every day.        Cholecalciferol (Tab) cholecalciferol 1000 UNIT Take 1,000 Units by mouth every day.        EPINEPHrine   by Injection route.        Hydrocodone-Acetaminophen (Tab) NORCO 5-325 MG Take 1-2 Tabs by mouth every four hours as needed.        LamoTRIgine (Tab) LAMICTAL 25 MG Take 1 Tab by mouth every day.        Nutritional Supplements   Take  by mouth.        Pregnenolone (Powder) Pregnenolone  by Does not apply route.        Thyroid (Tab) ARMOUR THYROID 120 MG Take 120 mg by mouth every day.        Thyroid (Tab) ARMOUR THYROID 30 MG Take 30 mg by mouth every 3 days. 2x weekly        .                 Medicines prescribed today were sent to:     [id number] - [place] - [address]    [address] [place] [address]    Phone: [phone] Fax: [phone]    Open 24 Hours?: No      Medication refill instructions:       If your prescription bottle indicates you have  medication refills left, it is not necessary to call your provider’s office. Please contact your pharmacy and they will refill your medication.    If your prescription bottle indicates you do not have any refills left, you may request refills at any time through one of the following ways: The online Do[employer] system (except Urgent Care), by calling your provider’s office, or by asking your pharmacy to contact your provider’s office with a refill request. Medication refills are processed only during regular business hours and may not be available until the next business day. Your provider may request additional information or to have a follow-up visit with you prior to refilling your medication.   *Please Note: Medication refills are assigned a new Rx number when refilled electronically. Your pharmacy may indicate that no refills were authorized even though a new prescription for the same medication is available at the pharmacy. Please request the medicine by name with the pharmacy before contacting your provider for a refill.           Do[employer] Access Code: Activation code not generated  Current Do[employer] Status: Active

## 2017-06-13 NOTE — ASSESSMENT & PLAN NOTE
Established problem followed by PCP, currently UTD on lipids and being managed conservatively with diet/exercise

## 2017-06-13 NOTE — PROGRESS NOTES
Chief Complaint   Patient presents with   • Annual Exam         HPI:  Saida Roblero is a 67 y.o. female here for Medicare Annual Wellness Visit      Patient Active Problem List    Diagnosis Date Noted   • Seizure disorder (CMS-HCC) 05/13/2013     Priority: High   • HLD (hyperlipidemia) 06/24/2015   • PVC's (premature ventricular contractions) 03/24/2014   • Peanut allergy 01/03/2014   • Shellfish allergy 01/03/2014   • Hypothyroid 05/13/2013       Current Outpatient Prescriptions   Medication Sig Dispense Refill   • EPINEPHrine (EPIPEN) 0.3 MG/0.3ML Solution Auto-injector solution for injection 0.3 mL by Intramuscular route Once for 1 dose. 0.3 mL 0   • hydrocodone-acetaminophen (NORCO) 5-325 MG Tab per tablet Take 1-2 Tabs by mouth every four hours as needed. 20 Tab 0   • ARMOUR THYROID 120 MG Tab Take 120 mg by mouth every day.     • ARMOUR THYROID 30 MG Tab Take 30 mg by mouth every 3 days. 2x weekly     • lamotrigine (LAMICTAL) 25 MG Tab Take 1 Tab by mouth every day. 30 Tab 11   • aspirin (ASA) 81 MG Chew Tab chewable tablet Take 81 mg by mouth every day.     • vitamin D (CHOLECALCIFEROL) 1000 UNIT Tab Take 1,000 Units by mouth every day.     • Nutritional Supplements (DHEA PO) Take  by mouth.     • Pregnenolone Powder by Does not apply route.     • EPINEPHrine (EPIPEN INJ) by Injection route.       No current facility-administered medications for this visit.        Patient is taking medications as noted in medication list.  Current supplements as per medication list.   Chronic narcotic pain medicines: no    Allergies: Peanut-derived and Shellfish allergy    Current social contact/activities: Yes     Is patient current with immunizations?  Yes.     She  reports that she has never smoked. She has never used smokeless tobacco. She reports that she does not drink alcohol or use illicit drugs.  Counseling given: Not Answered        DPA/Advanced Directive:  Patient has Advanced Directive on file.     ROS:     Gait: Uses no assistive device   Ostomy: no   Other tubes: no   Amputations: no   Chronic oxygen use: no   Last eye exam: Last fall   Wears hearing aids: no   : Denies incontinence.       Screening:      Depression Screening    Little interest or pleasure in doing things?  0 - not at all  Feeling down, depressed, or hopeless?  0 - not at all  Patient Health Questionnaire Score: 0  If depressive symptoms identified deferred to follow up visit unless specifically addressed in assessment and plan.    Interpretation of PHQ-9 Total Score   Score Severity   1-4 Minimal Depression   5-9 Mild Depression   10-14 Moderate Depression   15-19 Moderately Severe Depression   20-27 Severe Depression    Screening for Cognitive Impairment    Three Minute Recall (banana, sunrise, fence)   3/3    Draw clock face with all 12 numbers set to the hand to show 10 minutes past 11 o'clock  1    If cognitive concerns identified deferred to follow up visit unless specifically addressed in assessment and plan.    Fall Risk Assessment    Has the patient had two or more falls in the last year or any fall with injury in the last year?  No  If Fall Risk identified deferred to follow up visit unless specifically addressed in assessment and plan.    Safety Assessment    Throw rugs on floor.  No  Handrails on all stairs.  Yes  Good lighting in all hallways.  Yes  Difficulty hearing.  No  Patient counseled about all safety risks that were identified.    Functional Assessment ADLs    Are there any barriers preventing you from cooking for yourself or meeting nutritional needs?  No.    Are there any barriers preventing you from driving safely or obtaining transportation?  No.    Are there any barriers preventing you from using a telephone or calling for help?  No.    Are there any barriers preventing you from shopping?  No.    Are there any barriers preventing you from taking care of your own finances?  No.    Are there any barriers preventing you  "from managing your medications?  No.    Are currently engaging any exercise or physical activity?  Yes.       Health Maintenance Summary                Annual Wellness Visit Overdue 6/24/2016      Done 6/24/2015     COLONOSCOPY Overdue 1/17/2017      Done 1/17/2014 AMB REFERRAL TO GI FOR COLONOSCOPY    MAMMOGRAM Next Due 10/26/2017      Done 10/26/2016 MA-MAMMO SCREENING BILAT W/TOMOSYNTHESIS W/CAD     Patient has more history with this topic...    BONE DENSITY Next Due 7/25/2018      Done 7/25/2013 DS-BONE DENSITY STUDY (DEXA)     Patient has more history with this topic...    PAP SMEAR Next Due 12/6/2019      Done 12/6/2016 PATHOLOGY GYN SPECIMEN     Patient has more history with this topic...    IMM DTaP/Tdap/Td Vaccine Next Due 1/3/2024      Done 1/3/2014 Imm Admin: Tdap Vaccine          Patient Care Team:  Anjelica Vidal D.O. as PCP - General (Family Medicine)  Thomas A Naegele, D.O. as Consulting Physician (Emergency Medicine)  Melissa P Bloch, M.D. as Consulting Physician (Neurology)    Social History   Substance Use Topics   • Smoking status: Never Smoker    • Smokeless tobacco: Never Used   • Alcohol Use: No     Family History   Problem Relation Age of Onset   • Heart Disease Maternal Grandmother    • Thyroid Mother    • Stroke Mother    • Heart Disease Mother      a fib   • Other Mother      temp arth   • Other Father      waldenstroms lymphoma     She  has a past medical history of Seizure (CMS-HCC); Thyroid disease; and Allergy. She also has no past medical history of Breast cancer (CMS-HCC).   Past Surgical History   Procedure Laterality Date   • Arthroscopy, knee  1998     right   • Carpal tunnel release  1998     right   • Myomectomy             Exam:     Blood pressure 136/74, pulse 94, temperature 37.2 °C (99 °F), height 1.626 m (5' 4\"), weight 65.772 kg (145 lb), SpO2 95 %. Body mass index is 24.88 kg/(m^2).    Hearing excellent  Dentition good  Alert, oriented in no acute distress.  Eye " contact is good, speech goal directed, affect calm      Assessment and Plan. The following treatment and monitoring plan is recommended:    1. Medicare annual wellness visit, subsequent  Annual Wellness Visit - Includes PPPS Subsequent ()   2. PVC's (premature ventricular contractions)  Stable with monitoring by PCP   3. Mixed hyperlipidemia  Stable followed by PCP   4. Peanut allergy  Stable, EpiPen due for renewal   EPINEPHrine (EPIPEN) 0.3 MG/0.3ML Solution Auto-injector solution for injection   5. Seizure disorder (CMS-HCC)  Stable followed by neurology    6. Acquired hypothyroidism  Stable followed by PCP         Services suggested: No services needed at this time  Health Care Screening recommendations as per orders if indicated.  Referrals offered: PT/OT/Nutrition counseling/Behavioral Health/Smoking cessation as per orders if indicated.    Discussion today about general wellness and lifestyle habits:    · Prevent falls and reduce trip hazards; Cautioned about securing or removing rugs.  · Have a working fire alarm and carbon monoxide detector;   · Engage in regular physical activity and social activities       Follow-up: Return in about 6 months (around 12/13/2017).

## 2017-06-23 ENCOUNTER — TELEPHONE (OUTPATIENT)
Dept: MEDICAL GROUP | Facility: PHYSICIAN GROUP | Age: 67
End: 2017-06-23

## 2017-06-23 DIAGNOSIS — Z91.010 PEANUT ALLERGY: ICD-10-CM

## 2017-06-23 DIAGNOSIS — Z91.013 SHELLFISH ALLERGY: ICD-10-CM

## 2017-06-23 RX ORDER — EPINEPHRINE 0.3 MG/.3ML
0.3 INJECTION SUBCUTANEOUS ONCE
Qty: 2 EACH | Refills: 3 | Status: SHIPPED | OUTPATIENT
Start: 2017-06-23 | End: 2018-07-13 | Stop reason: SDUPTHER

## 2017-06-23 NOTE — TELEPHONE ENCOUNTER
Received a refill request for and epipen 2-pack, this is not on current medication list so I am unable tore-order. Please place new order.

## 2017-07-09 ENCOUNTER — PATIENT MESSAGE (OUTPATIENT)
Dept: MEDICAL GROUP | Facility: PHYSICIAN GROUP | Age: 67
End: 2017-07-09

## 2017-07-24 ENCOUNTER — RX ONLY (OUTPATIENT)
Age: 67
Setting detail: RX ONLY
End: 2017-07-24

## 2017-11-03 ENCOUNTER — HOSPITAL ENCOUNTER (OUTPATIENT)
Dept: RADIOLOGY | Facility: MEDICAL CENTER | Age: 67
End: 2017-11-03
Attending: FAMILY MEDICINE
Payer: MEDICARE

## 2017-11-03 DIAGNOSIS — Z12.39 SCREENING BREAST EXAMINATION: ICD-10-CM

## 2017-11-03 PROCEDURE — G0202 SCR MAMMO BI INCL CAD: HCPCS

## 2017-11-08 ENCOUNTER — TELEPHONE (OUTPATIENT)
Dept: MEDICAL GROUP | Facility: PHYSICIAN GROUP | Age: 67
End: 2017-11-08

## 2017-11-08 NOTE — LETTER
[unfilled]  [unfilled] November 8, 2017     Saida Roblero  [address] [address]      Dear Saida:    Below are the results from your recent visit:    Please advise pt that the mammogram does show dense breast tissue but is otherwise normal. Based on age, recommend repeat mammogram in one year.     Anjelica Vidal D.O.     Resulted Orders   MA-MAMMO SCREENING BILAT W/FRANCIA W/CAD    Narrative    11/3/2017 5:19 PM    HISTORY/REASON FOR EXAM:  Routine Mammographic Screening.      TECHNIQUE/EXAM DESCRIPTION:  Bilateral tomosynthesis screening mammography was performed with standard mammographic images generated from the data set. Images were reviewed and interpreted with CAD.    COMPARISON:   October 26, 2016 and September 9, 2015    FINDINGS:    The breasts are heterogeneously dense, which may obscure small masses.  There is no dominant mass, suspicious calcification, or any secondary malignant sign.  Bilateral benign-appearing calcifications once again noted. A stable intramammary lymph node   identified superficially in the superior right breast.      Impression    1.  Breasts are heterogeneously dense, which may obscure small masses. No gross evidence of malignancy.    2.  Screening mammogram in one year is recommended.      R2 Category 2: Benign Finding(s)     The test results show that your current treatment is working. Please {:[id number]}. We recommend that you repeat the above test(s) in {:74805} {:11}.    If you have any questions or concerns, please don't hesitate to call.

## 2017-11-08 NOTE — TELEPHONE ENCOUNTER
----- Message from Anjelica Vidal D.O. sent at 11/7/2017  7:58 AM PST -----  Please advise pt that the mammogram does show dense breast tissue but is otherwise normal. Based on age, recommend repeat mammogram in one year.    Anjelica Vidal D.O.

## 2017-12-01 ENCOUNTER — OFFICE VISIT (OUTPATIENT)
Dept: MEDICAL GROUP | Facility: PHYSICIAN GROUP | Age: 67
End: 2017-12-01
Payer: MEDICARE

## 2017-12-01 ENCOUNTER — HOSPITAL ENCOUNTER (OUTPATIENT)
Dept: LAB | Facility: MEDICAL CENTER | Age: 67
End: 2017-12-01
Attending: FAMILY MEDICINE
Payer: MEDICARE

## 2017-12-01 VITALS
BODY MASS INDEX: 23.73 KG/M2 | HEIGHT: 64 IN | WEIGHT: 139 LBS | OXYGEN SATURATION: 96 % | TEMPERATURE: 98.3 F | HEART RATE: 71 BPM | SYSTOLIC BLOOD PRESSURE: 136 MMHG | DIASTOLIC BLOOD PRESSURE: 80 MMHG

## 2017-12-01 DIAGNOSIS — R03.0 ELEVATED BLOOD PRESSURE READING: ICD-10-CM

## 2017-12-01 DIAGNOSIS — E03.9 ACQUIRED HYPOTHYROIDISM: Chronic | ICD-10-CM

## 2017-12-01 DIAGNOSIS — E78.2 MIXED HYPERLIPIDEMIA: ICD-10-CM

## 2017-12-01 DIAGNOSIS — Z23 NEED FOR VACCINATION: ICD-10-CM

## 2017-12-01 LAB
CHOLEST SERPL-MCNC: 227 MG/DL (ref 100–199)
HDLC SERPL-MCNC: 83 MG/DL
LDLC SERPL CALC-MCNC: 112 MG/DL
T3 SERPL-MCNC: 167.8 NG/DL (ref 60–181)
T4 FREE SERPL-MCNC: 1.02 NG/DL (ref 0.53–1.43)
THYROPEROXIDASE AB SERPL-ACNC: 0.5 IU/ML (ref 0–9)
TRIGL SERPL-MCNC: 159 MG/DL (ref 0–149)
TSH SERPL DL<=0.005 MIU/L-ACNC: 0.02 UIU/ML (ref 0.3–3.7)

## 2017-12-01 PROCEDURE — 80061 LIPID PANEL: CPT

## 2017-12-01 PROCEDURE — 86376 MICROSOMAL ANTIBODY EACH: CPT

## 2017-12-01 PROCEDURE — 36415 COLL VENOUS BLD VENIPUNCTURE: CPT

## 2017-12-01 PROCEDURE — 84443 ASSAY THYROID STIM HORMONE: CPT

## 2017-12-01 PROCEDURE — 84439 ASSAY OF FREE THYROXINE: CPT

## 2017-12-01 PROCEDURE — G0008 ADMIN INFLUENZA VIRUS VAC: HCPCS | Performed by: FAMILY MEDICINE

## 2017-12-01 PROCEDURE — 84480 ASSAY TRIIODOTHYRONINE (T3): CPT

## 2017-12-01 PROCEDURE — 99214 OFFICE O/P EST MOD 30 MIN: CPT | Mod: 25 | Performed by: FAMILY MEDICINE

## 2017-12-01 PROCEDURE — 90662 IIV NO PRSV INCREASED AG IM: CPT | Performed by: FAMILY MEDICINE

## 2017-12-01 NOTE — ASSESSMENT & PLAN NOTE
Ongoing issue. Patient reports compliance with [place] Thyroid 120 mg daily. Patient did not get blood work done prior to this appointment. She reports that she is doing well since taking herself off the additional [place] Thyroid. She is having some hot flashes but this may have been secondary to supplements that she had previously been taking.

## 2017-12-01 NOTE — ASSESSMENT & PLAN NOTE
Ongoing issue. Patient has had a slightly elevated total cholesterol and LDL cholesterol. She reports that she does eat healthy but currently isn't getting any regular daily exercise.

## 2017-12-01 NOTE — PROGRESS NOTES
Subjective:   Saida Roblero is a 67 y.o. female here today for Thyroid, elevated cholesterol, elevated blood pressure    Hypothyroid  Ongoing issue. Patient reports compliance with [place] Thyroid 120 mg daily. Patient did not get blood work done prior to this appointment. She reports that she is doing well since taking herself off the additional [place] Thyroid. She is having some hot flashes but this may have been secondary to supplements that she had previously been taking.    HLD (hyperlipidemia)  Ongoing issue. Patient has had a slightly elevated total cholesterol and LDL cholesterol. She reports that she does eat healthy but currently isn't getting any regular daily exercise.    Elevated blood pressure reading  Ongoing issue. Patient's started having mildly elevated blood pressure readings on previous visits. Recheck today was within normal limits. Patient reports that she will start taking her blood pressure at home. She does not have any family history of elevated blood pressure.         Current medicines (including changes today)  Current Outpatient Prescriptions   Medication Sig Dispense Refill   • EPINEPHrine 1 MG/ML Kit 1 Kit by Injection route as needed. 2 Kit 1   • ARMOUR THYROID 120 MG Tab Take 120 mg by mouth every day.     • lamotrigine (LAMICTAL) 25 MG Tab Take 1 Tab by mouth every day. 30 Tab 11   • aspirin (ASA) 81 MG Chew Tab chewable tablet Take 81 mg by mouth every day.     • vitamin D (CHOLECALCIFEROL) 1000 UNIT Tab Take 1,000 Units by mouth every day.     • EPINEPHrine (EPIPEN INJ) by Injection route.       No current facility-administered medications for this visit.      She  has a past medical history of Allergy; Seizure (CMS-HCC); and Thyroid disease. She also has no past medical history of Breast cancer (CMS-HCC).    ROS   No chest pain, no shortness of breath, no abdominal pain       Objective:     Blood pressure 136/80, pulse 71, temperature 36.8 °C (98.3 °F), height 1.626 m  "(5' 4\"), weight 63 kg (139 lb), SpO2 96 %. Body mass index is 23.86 kg/m².   Physical Exam:  Alert, oriented in no acute distress.  Eye contact is good, speech goal directed, affect calm  HEENT: conjunctiva non-injected, sclera non-icteric.  Pinna normal. Oral mucous membranes pink and moist with no lesions.  Neck No adenopathy or masses in the neck or supraclavicular regions.  Lungs: clear to auscultation bilaterally with good excursion.  CV: regular rate and rhythm.  Abdomen: soft, nontender, No CVAT  Ext: no edema, color normal, vascularity normal, temperature normal        Assessment and Plan:   The following treatment plan was discussed     1. Acquired hypothyroidism  TSH    FREE THYROXINE    TRIIDOTHYRONINE    THYROID PEROXIDASE  (TPO) AB    Stable. Continue current medications; sent for labs and monitor for results   2. Mixed hyperlipidemia  LIPID PROFILE    Stable. Continue healthy lifestyle; monitor   3. Elevated blood pressure reading      Stable. Encouraged healthy lifestyle; monitor   4. Need for vaccination  INFLUENZA VACCINE, HIGH DOSE (65+ ONLY)    Age appropriate flu immunization provided today; patient tolerated procedure well       Followup: Return in about 6 months (around 6/1/2018) for elevated BP, Short.            "

## 2017-12-01 NOTE — ASSESSMENT & PLAN NOTE
Ongoing issue. Patient's started having mildly elevated blood pressure readings on previous visits. Recheck today was within normal limits. Patient reports that she will start taking her blood pressure at home. She does not have any family history of elevated blood pressure.

## 2018-02-03 ENCOUNTER — HOSPITAL ENCOUNTER (OUTPATIENT)
Dept: RADIOLOGY | Facility: MEDICAL CENTER | Age: 68
End: 2018-02-03
Attending: OBSTETRICS & GYNECOLOGY
Payer: MEDICARE

## 2018-02-03 DIAGNOSIS — M89.9 BONE DISEASE: ICD-10-CM

## 2018-02-03 PROCEDURE — 77080 DXA BONE DENSITY AXIAL: CPT

## 2018-02-13 ENCOUNTER — OFFICE VISIT (OUTPATIENT)
Dept: NEUROLOGY | Facility: MEDICAL CENTER | Age: 68
End: 2018-02-13
Payer: MEDICARE

## 2018-02-13 VITALS
DIASTOLIC BLOOD PRESSURE: 64 MMHG | WEIGHT: 145 LBS | HEIGHT: 64 IN | HEART RATE: 81 BPM | TEMPERATURE: 98.1 F | OXYGEN SATURATION: 95 % | BODY MASS INDEX: 24.75 KG/M2 | SYSTOLIC BLOOD PRESSURE: 136 MMHG

## 2018-02-13 DIAGNOSIS — G40.909 SEIZURE DISORDER (HCC): Chronic | ICD-10-CM

## 2018-02-13 PROCEDURE — 99214 OFFICE O/P EST MOD 30 MIN: CPT | Performed by: PSYCHIATRY & NEUROLOGY

## 2018-02-13 RX ORDER — LAMOTRIGINE 25 MG/1
25 TABLET ORAL DAILY
Qty: 90 TAB | Refills: 3 | Status: SHIPPED | OUTPATIENT
Start: 2018-02-13

## 2018-02-13 NOTE — ASSESSMENT & PLAN NOTE
No seizures in 10 years. Pt states her last one was when she had the flu and she had a temporal lobe seizure.

## 2018-02-13 NOTE — PROGRESS NOTES
CHIEF COMPLAINT  Chief Complaint   Patient presents with   • Follow-Up     seizure and meds       HPI  Saida Roblero is a 64 y.o. female who presents for follow up of seizure.  Seizure disorder (CMS-HCC)  No seizures in 10 years. Pt states her last one was when she had the flu and she had a temporal lobe seizure.    REVIEW OF SYSTEMS  Pertinent Positives:irregular HR, healthy diet, hypothyroid symptoms during drug adjustment   All other systems are negative.     PAST MEDICAL HISTORY  Past Medical History:   Diagnosis Date   • Allergy     peanut/shellfish   • Seizure (CMS-HCC)    • Thyroid disease        SOCIAL HISTORY  Social History     Social History   • Marital status:      Spouse name: N/A   • Number of children: N/A   • Years of education: N/A     Occupational History   • Not on file.     Social History Main Topics   • Smoking status: Never Smoker   • Smokeless tobacco: Never Used   • Alcohol use No   • Drug use: No   • Sexual activity: Yes     Partners: Male     Other Topics Concern   • Not on file     Social History Narrative   • No narrative on file       SURGICAL HISTORY  Past Surgical History:   Procedure Laterality Date   • ARTHROSCOPY, KNEE  1998    right   • CARPAL TUNNEL RELEASE  1998    right   • MYOMECTOMY         CURRENT MEDICATIONS  Current Outpatient Prescriptions   Medication Sig Dispense Refill   • EPINEPHrine 1 MG/ML Kit 1 Kit by Injection route as needed. 2 Kit 1   • ARMOUR THYROID 120 MG Tab Take 120 mg by mouth every day.     • lamotrigine (LAMICTAL) 25 MG Tab Take 1 Tab by mouth every day. 30 Tab 11   • aspirin (ASA) 81 MG Chew Tab chewable tablet Take 81 mg by mouth every day.     • vitamin D (CHOLECALCIFEROL) 1000 UNIT Tab Take 1,000 Units by mouth every day.     • EPINEPHrine (EPIPEN INJ) by Injection route.       No current facility-administered medications for this visit.        ALLERGIES  Allergies   Allergen Reactions   • Peanut-Derived    • Shellfish Allergy   "      PHYSICAL EXAM  VITAL SIGNS: /64   Pulse 81   Temp 36.7 °C (98.1 °F)   Ht 1.626 m (5' 4\")   Wt 65.8 kg (145 lb)   SpO2 95%   BMI 24.89 kg/m²   Constitutional: Well developed, Well nourished, No acute distress, Non-toxic appearance.   HENT: normocephalic; atraumatic  Eyes: PERRL  Neck: Normal range of motion, No tenderness, Supple, No stridor.   Cardiovascular: Normal heart rate, Normal rhythm, No murmurs, No rubs, No gallops.   Thorax & Lungs: Normal breath sounds, No respiratory distress, No wheezing, No chest tenderness.   Abdomen: Bowel sounds normal, Soft, No tenderness, No masses, No pulsatile masses.   Skin: Warm, Dry, No erythema, No rash.   Back: No tenderness, No CVA tenderness.   Extremities: Intact distal pulses, No edema, No tenderness, No cyanosis, No clubbing.   Neurologic: a&Ox3, CN:2-12, motor : intact, sensory: Intact, CB and gait:intact  Psychiatric: Affect normal, Judgment normal, Mood normal.   Lab: Reviewed with patient in detail and as noted in results.    EEG  Abnormal   IMPRESSION: Possibly abnormal electroencephalogram with rare left   temporal sharp wave activity with phase reversals. This could   represent an underlying paroxysmal focus or could represent a normal   variant. Clinical correlation is suggested.          ASSESSMENT AND PLAN  1. Seizure disorder   Continue lamictal at low dose as seizures well controlled.     I spent 25 minutes with this patient, over fifty percent was spent counseling patient on their condition, best management practices, reviewing test results and risks and benefits of treatment.        "

## 2018-06-13 ENCOUNTER — OFFICE VISIT (OUTPATIENT)
Dept: MEDICAL GROUP | Facility: PHYSICIAN GROUP | Age: 68
End: 2018-06-13
Payer: MEDICARE

## 2018-06-13 VITALS
TEMPERATURE: 97.5 F | HEIGHT: 64 IN | OXYGEN SATURATION: 99 % | BODY MASS INDEX: 24.24 KG/M2 | DIASTOLIC BLOOD PRESSURE: 62 MMHG | HEART RATE: 79 BPM | WEIGHT: 142 LBS | SYSTOLIC BLOOD PRESSURE: 132 MMHG

## 2018-06-13 DIAGNOSIS — G40.909 SEIZURE DISORDER (HCC): Chronic | ICD-10-CM

## 2018-06-13 DIAGNOSIS — E03.9 ACQUIRED HYPOTHYROIDISM: Chronic | ICD-10-CM

## 2018-06-13 DIAGNOSIS — E78.2 MIXED HYPERLIPIDEMIA: ICD-10-CM

## 2018-06-13 DIAGNOSIS — R03.0 ELEVATED BLOOD PRESSURE READING: ICD-10-CM

## 2018-06-13 DIAGNOSIS — M85.89 OSTEOPENIA OF MULTIPLE SITES: ICD-10-CM

## 2018-06-13 PROBLEM — E78.5 DYSLIPIDEMIA: Status: RESOLVED | Noted: 2018-06-13 | Resolved: 2018-06-13

## 2018-06-13 PROBLEM — E78.5 DYSLIPIDEMIA: Status: ACTIVE | Noted: 2018-06-13

## 2018-06-13 PROCEDURE — 99214 OFFICE O/P EST MOD 30 MIN: CPT | Performed by: FAMILY MEDICINE

## 2018-06-13 ASSESSMENT — PATIENT HEALTH QUESTIONNAIRE - PHQ9: CLINICAL INTERPRETATION OF PHQ2 SCORE: 0

## 2018-06-13 NOTE — ASSESSMENT & PLAN NOTE
Ongoing issue; patient currently denies any headache, dizziness, chest pain. Blood pressure in office today is mildly elevated at 132/62. Patient reports that her home blood pressure reading first thing in the morning as a systolic pressure not greater than 130 and diastolic pressure not greater than 80

## 2018-06-13 NOTE — ASSESSMENT & PLAN NOTE
Ongoing issue; patient compliant with [place] Thyroid 120 mg daily; currently denies any new issues with temperature intolerance, skin changes, hair changes, anxiety

## 2018-06-13 NOTE — ASSESSMENT & PLAN NOTE
Ongoing issue; patient reports that she is trying to eat healthy but is not getting any regular daily exercise. We have reviewed her old records which do show an elevated lipid panel.

## 2018-06-13 NOTE — PROGRESS NOTES
Subjective:   Saida Roblero is a 68 y.o. female here today for seizure disorder, elevated blood pressure reading, osteopenia, elevated lipids, thyroid    Seizure disorder (CMS-[facility])  Ongoing issues; patient continues on Lamictal 25 mg daily; denies that she has had any seizures in the last year. She follows with neurology for this issue    Hypothyroid  Ongoing issue; patient compliant with [place] Thyroid 120 mg daily; currently denies any new issues with temperature intolerance, skin changes, hair changes, anxiety    HLD (hyperlipidemia)  Ongoing issue; patient reports that she is trying to eat healthy but is not getting any regular daily exercise. We have reviewed her old records which do show an elevated lipid panel.    Elevated blood pressure reading  Ongoing issue; patient currently denies any headache, dizziness, chest pain. Blood pressure in office today is mildly elevated at 132/62. Patient reports that her home blood pressure reading first thing in the morning as a systolic pressure not greater than 130 and diastolic pressure not greater than 80    Osteopenia of multiple sites  This problem is new to me. Patient continues to see GYN who ordered a bone scan recently. Bone scan revealed osteopenia which is actually worsening from previous bone scan. Patient currently denies any long bone or vertebral fracture; she truly compliant with supplemental calcium and vitamin D   Strongly encouraged patient to add weightbearing exercise         Current medicines (including changes today)  Current Outpatient Prescriptions   Medication Sig Dispense Refill   • calcium acetate (PHOS-LO) 667 MG Cap Take 1,334 mg by mouth 3 times a day, with meals.     • lamoTRIgine (LAMICTAL) 25 MG Tab Take 1 Tab by mouth every day. 90 Tab 3   • ARMOUR THYROID 120 MG Tab Take 120 mg by mouth every day.     • aspirin (ASA) 81 MG Chew Tab chewable tablet Take 81 mg by mouth every day.     • vitamin D (CHOLECALCIFEROL) 1000 UNIT Tab  "Take 1,000 Units by mouth every day.     • EPINEPHrine 1 MG/ML Kit 1 Kit by Injection route as needed. 2 Kit 1   • EPINEPHrine (EPIPEN INJ) by Injection route.       No current facility-administered medications for this visit.      She  has a past medical history of Allergy; Seizure (HCC); and Thyroid disease. She also has no past medical history of Breast cancer (formerly [facility]).    ROS   No chest pain, no shortness of breath, no abdominal pain       Objective:     Blood pressure 132/62, pulse 79, temperature 36.4 °C (97.5 °F), height 1.626 m (5' 4\"), weight 64.4 kg (142 lb), SpO2 99 %. Body mass index is 24.37 kg/m².   Physical Exam:  Alert, oriented in no acute distress.  Eye contact is good, speech goal directed, affect calm  HEENT: conjunctiva non-injected, sclera non-icteric.  Pinna normal. TM pearly gray.   Oral mucous membranes pink and moist with no lesions.  Neck No adenopathy or masses in the neck or supraclavicular regions.  Lungs: clear to auscultation bilaterally with good excursion.  CV: regular rate and rhythm.  Abdomen: soft, nontender, No CVAT  Ext: no edema, color normal, vascularity normal, temperature normal  Neuro: Cranial nerves II through XII grossly intact      Assessment and Plan:   The following treatment plan was discussed   1. Osteopenia of multiple sites      Stable. Continue supplementation; add weight-bearing exercise 3-5 days a week   2. Acquired hypothyroidism  TSH    FREE THYROXINE    Stable. Continue current medications; labs ordered for reevaluation   3. Elevated blood pressure reading      Stable. Normal blood pressure readings at home; monitor   4. Seizure disorder (CMS-HCC)      Stable. Continue current medications; follow with neurology as directed   5. Mixed hyperlipidemia  COMP METABOLIC PANEL    LIPID PROFILE    Uncontrolled; labs ordered for reevaluation; monitor       Followup: Return in about 6 months (around 12/13/2018) for lab review, Short.            "

## 2018-06-13 NOTE — ASSESSMENT & PLAN NOTE
This problem is new to me. Patient continues to see GYN who ordered a bone scan recently. Bone scan revealed osteopenia which is actually worsening from previous bone scan. Patient currently denies any long bone or vertebral fracture; she truly compliant with supplemental calcium and vitamin D   Strongly encouraged patient to add weightbearing exercise

## 2018-06-13 NOTE — ASSESSMENT & PLAN NOTE
Ongoing issues; patient continues on Lamictal 25 mg daily; denies that she has had any seizures in the last year. She follows with neurology for this issue

## 2018-07-10 ENCOUNTER — RX ONLY (OUTPATIENT)
Age: 68
Setting detail: RX ONLY
End: 2018-07-10

## 2018-07-13 ENCOUNTER — PATIENT MESSAGE (OUTPATIENT)
Dept: MEDICAL GROUP | Facility: PHYSICIAN GROUP | Age: 68
End: 2018-07-13

## 2018-07-13 DIAGNOSIS — Z91.010 PEANUT ALLERGY: ICD-10-CM

## 2018-07-13 DIAGNOSIS — Z91.013 SHELLFISH ALLERGY: ICD-10-CM

## 2018-07-13 RX ORDER — EPINEPHRINE 0.3 MG/.3ML
0.3 INJECTION SUBCUTANEOUS ONCE
Qty: 2 EACH | Refills: 2 | Status: SHIPPED | OUTPATIENT
Start: 2018-07-13 | End: 2018-07-13

## 2018-07-13 NOTE — TELEPHONE ENCOUNTER
From: Saida Roblero  To: Anjelica Vidal D.O.  Sent: 2018 7:41 AM PDT  Subject: Prescription Question    I need a new prescription for my EpiPen sent to my pharmacy - [address] My current one  2017. Thanks much, Saida

## 2021-01-15 DIAGNOSIS — Z23 NEED FOR VACCINATION: ICD-10-CM
